# Patient Record
Sex: FEMALE | Race: WHITE | URBAN - METROPOLITAN AREA
[De-identification: names, ages, dates, MRNs, and addresses within clinical notes are randomized per-mention and may not be internally consistent; named-entity substitution may affect disease eponyms.]

---

## 2018-11-28 ENCOUNTER — APPOINTMENT (RX ONLY)
Dept: URBAN - METROPOLITAN AREA CLINIC 4 | Facility: CLINIC | Age: 68
Setting detail: DERMATOLOGY
End: 2018-11-28

## 2018-11-28 DIAGNOSIS — Z71.89 OTHER SPECIFIED COUNSELING: ICD-10-CM

## 2018-11-28 DIAGNOSIS — L71.8 OTHER ROSACEA: ICD-10-CM

## 2018-11-28 PROCEDURE — ? ADDITIONAL NOTES

## 2018-11-28 PROCEDURE — 99202 OFFICE O/P NEW SF 15 MIN: CPT

## 2018-11-28 PROCEDURE — ? COUNSELING

## 2018-11-28 PROCEDURE — ? PRESCRIPTION

## 2018-11-28 RX ORDER — METRONIDAZOLE 7.5 MG/G
CREAM TOPICAL
Qty: 1 | Refills: 6 | Status: ERX | COMMUNITY
Start: 2018-11-28

## 2018-11-28 RX ADMIN — METRONIDAZOLE 1: 7.5 CREAM TOPICAL at 00:00

## 2018-11-28 ASSESSMENT — LOCATION SIMPLE DESCRIPTION DERM
LOCATION SIMPLE: CHEST
LOCATION SIMPLE: LEFT CHEEK
LOCATION SIMPLE: RIGHT CHEEK

## 2018-11-28 ASSESSMENT — LOCATION ZONE DERM
LOCATION ZONE: TRUNK
LOCATION ZONE: FACE

## 2018-11-28 ASSESSMENT — LOCATION DETAILED DESCRIPTION DERM
LOCATION DETAILED: RIGHT INFERIOR MEDIAL MALAR CHEEK
LOCATION DETAILED: LEFT INFERIOR CENTRAL MALAR CHEEK
LOCATION DETAILED: STERNAL NOTCH

## 2018-11-28 NOTE — HPI: RASH
What Type Of Note Output Would You Prefer (Optional)?: Standard Output
How Severe Is Your Rash?: mild
Is This A New Presentation, Or A Follow-Up?: Rash
Additional History: Patient states she always uses Oil of Olay and then changed to a citrus cleanser 2 years ago. She’s always used a moisturizer from [employer].

## 2018-11-28 NOTE — PROCEDURE: ADDITIONAL NOTES
Detail Level: Detailed
Additional Notes: Patient states she is unwilling to change/discontinue her facial regimen at this time, however is open to adding a topical Rx. Will attempt Metrocream. Explained other potential treatment options if this fails.

## 2019-05-06 ENCOUNTER — APPOINTMENT (RX ONLY)
Dept: URBAN - METROPOLITAN AREA CLINIC 4 | Facility: CLINIC | Age: 69
Setting detail: DERMATOLOGY
End: 2019-05-06

## 2019-05-06 DIAGNOSIS — D18.0 HEMANGIOMA: ICD-10-CM

## 2019-05-06 DIAGNOSIS — L259 CONTACT DERMATITIS AND OTHER ECZEMA, UNSPECIFIED CAUSE: ICD-10-CM

## 2019-05-06 DIAGNOSIS — L82.1 OTHER SEBORRHEIC KERATOSIS: ICD-10-CM

## 2019-05-06 DIAGNOSIS — D22 MELANOCYTIC NEVI: ICD-10-CM

## 2019-05-06 DIAGNOSIS — L81.4 OTHER MELANIN HYPERPIGMENTATION: ICD-10-CM

## 2019-05-06 DIAGNOSIS — Z71.89 OTHER SPECIFIED COUNSELING: ICD-10-CM

## 2019-05-06 PROBLEM — D22.71 MELANOCYTIC NEVI OF RIGHT LOWER LIMB, INCLUDING HIP: Status: ACTIVE | Noted: 2019-05-06

## 2019-05-06 PROBLEM — D22.39 MELANOCYTIC NEVI OF OTHER PARTS OF FACE: Status: ACTIVE | Noted: 2019-05-06

## 2019-05-06 PROBLEM — D22.72 MELANOCYTIC NEVI OF LEFT LOWER LIMB, INCLUDING HIP: Status: ACTIVE | Noted: 2019-05-06

## 2019-05-06 PROBLEM — D22.5 MELANOCYTIC NEVI OF TRUNK: Status: ACTIVE | Noted: 2019-05-06

## 2019-05-06 PROBLEM — D22.62 MELANOCYTIC NEVI OF LEFT UPPER LIMB, INCLUDING SHOULDER: Status: ACTIVE | Noted: 2019-05-06

## 2019-05-06 PROBLEM — L30.8 OTHER SPECIFIED DERMATITIS: Status: ACTIVE | Noted: 2019-05-06

## 2019-05-06 PROBLEM — D18.01 HEMANGIOMA OF SKIN AND SUBCUTANEOUS TISSUE: Status: ACTIVE | Noted: 2019-05-06

## 2019-05-06 PROBLEM — D22.61 MELANOCYTIC NEVI OF RIGHT UPPER LIMB, INCLUDING SHOULDER: Status: ACTIVE | Noted: 2019-05-06

## 2019-05-06 PROCEDURE — ? COUNSELING

## 2019-05-06 PROCEDURE — ? PRESCRIPTION

## 2019-05-06 PROCEDURE — 99214 OFFICE O/P EST MOD 30 MIN: CPT

## 2019-05-06 PROCEDURE — ? ADDITIONAL NOTES

## 2019-05-06 PROCEDURE — ? MEDICATION COUNSELING

## 2019-05-06 RX ORDER — TRIAMCINOLONE ACETONIDE 1 MG/G
CREAM TOPICAL QD
Qty: 1 | Refills: 2 | Status: ERX | COMMUNITY
Start: 2019-05-06

## 2019-05-06 RX ADMIN — TRIAMCINOLONE ACETONIDE 1: 1 CREAM TOPICAL at 00:00

## 2019-05-06 ASSESSMENT — LOCATION ZONE DERM
LOCATION ZONE: TRUNK
LOCATION ZONE: FACE
LOCATION ZONE: ARM
LOCATION ZONE: LEG

## 2019-05-06 ASSESSMENT — LOCATION DETAILED DESCRIPTION DERM
LOCATION DETAILED: LEFT MEDIAL UPPER BACK
LOCATION DETAILED: LEFT MEDIAL MALAR CHEEK
LOCATION DETAILED: LEFT PROXIMAL POSTERIOR UPPER ARM
LOCATION DETAILED: LEFT DISTAL POSTERIOR UPPER ARM
LOCATION DETAILED: LEFT ANTERIOR DISTAL UPPER ARM
LOCATION DETAILED: LOWER STERNUM
LOCATION DETAILED: RIGHT DISTAL POSTERIOR UPPER ARM
LOCATION DETAILED: SUPERIOR THORACIC SPINE
LOCATION DETAILED: LEFT SUPERIOR MEDIAL UPPER BACK
LOCATION DETAILED: RIGHT INFERIOR MEDIAL MALAR CHEEK
LOCATION DETAILED: RIGHT DISTAL POSTERIOR THIGH
LOCATION DETAILED: RIGHT PROXIMAL POSTERIOR UPPER ARM
LOCATION DETAILED: INFERIOR THORACIC SPINE
LOCATION DETAILED: EPIGASTRIC SKIN
LOCATION DETAILED: LEFT DISTAL POSTERIOR THIGH
LOCATION DETAILED: MIDDLE STERNUM
LOCATION DETAILED: LEFT INFERIOR MEDIAL FOREHEAD
LOCATION DETAILED: RIGHT ANTERIOR DISTAL UPPER ARM
LOCATION DETAILED: LEFT ELBOW

## 2019-05-06 ASSESSMENT — LOCATION SIMPLE DESCRIPTION DERM
LOCATION SIMPLE: LEFT UPPER BACK
LOCATION SIMPLE: RIGHT POSTERIOR UPPER ARM
LOCATION SIMPLE: RIGHT POSTERIOR THIGH
LOCATION SIMPLE: RIGHT UPPER ARM
LOCATION SIMPLE: LEFT CHEEK
LOCATION SIMPLE: LEFT ELBOW
LOCATION SIMPLE: LEFT FOREHEAD
LOCATION SIMPLE: LEFT POSTERIOR THIGH
LOCATION SIMPLE: LEFT UPPER ARM
LOCATION SIMPLE: UPPER BACK
LOCATION SIMPLE: RIGHT CHEEK
LOCATION SIMPLE: LEFT POSTERIOR UPPER ARM
LOCATION SIMPLE: CHEST
LOCATION SIMPLE: ABDOMEN

## 2019-05-06 NOTE — PROCEDURE: MEDICATION COUNSELING
Griseofulvin Pregnancy And Lactation Text: This medication is Pregnancy Category X and is known to cause serious birth defects. It is unknown if this medication is excreted in breast milk but breast feeding should be avoided.
Clindamycin Pregnancy And Lactation Text: This medication can be used in pregnancy if certain situations. Clindamycin is also present in breast milk.
Tazorac Counseling:  Patient advised that medication is irritating and drying.  Patient may need to apply sparingly and wash off after an hour before eventually leaving it on overnight.  The patient verbalized understanding of the proper use and possible adverse effects of tazorac.  All of the patient's questions and concerns were addressed.
Oxybutynin Counseling:  I discussed with the patient the risks of oxybutynin including but not limited to skin rash, drowsiness, dry mouth, difficulty urinating, and blurred vision.
Colchicine Pregnancy And Lactation Text: This medication is Pregnancy Category C and isn't considered safe during pregnancy. It is excreted in breast milk.
Cyclophosphamide Counseling:  I discussed with the patient the risks of cyclophosphamide including but not limited to hair loss, hormonal abnormalities, decreased fertility, abdominal pain, diarrhea, nausea and vomiting, bone marrow suppression and infection. The patient understands that monitoring is required while taking this medication.
Siliq Counseling:  I discussed with the patient the risks of Siliq including but not limited to new or worsening depression, suicidal thoughts and behavior, immunosuppression, malignancy, posterior leukoencephalopathy syndrome, and serious infections.  The patient understands that monitoring is required including a PPD at baseline and must alert us or the primary physician if symptoms of infection or other concerning signs are noted. There is also a special program designed to monitor depression which is required with Siliq.
Imiquimod Counseling:  I discussed with the patient the risks of imiquimod including but not limited to erythema, scaling, itching, weeping, crusting, and pain.  Patient understands that the inflammatory response to imiquimod is variable from person to person and was educated regarded proper titration schedule.  If flu-like symptoms develop, patient knows to discontinue the medication and contact us.
Isotretinoin Pregnancy And Lactation Text: This medication is Pregnancy Category X and is considered extremely dangerous during pregnancy. It is unknown if it is excreted in breast milk.
Imiquimod Pregnancy And Lactation Text: This medication is Pregnancy Category C. It is unknown if this medication is excreted in breast milk.
Itraconazole Counseling:  I discussed with the patient the risks of itraconazole including but not limited to liver damage, nausea/vomiting, neuropathy, and severe allergy.  The patient understands that this medication is best absorbed when taken with acidic beverages such as non-diet cola or ginger ale.  The patient understands that monitoring is required including baseline LFTs and repeat LFTs at intervals.  The patient understands that they are to contact us or the primary physician if concerning signs are noted.
High Dose Vitamin A Counseling: Side effects reviewed, pt to contact office should one occur.
Oxybutynin Pregnancy And Lactation Text: This medication is Pregnancy Category B and is considered safe during pregnancy. It is unknown if it is excreted in breast milk.
Doxycycline Counseling:  Patient counseled regarding possible photosensitivity and increased risk for sunburn.  Patient instructed to avoid sunlight, if possible.  When exposed to sunlight, patients should wear protective clothing, sunglasses, and sunscreen.  The patient was instructed to call the office immediately if the following severe adverse effects occur:  hearing changes, easy bruising/bleeding, severe headache, or vision changes.  The patient verbalized understanding of the proper use and possible adverse effects of doxycycline.  All of the patient's questions and concerns were addressed.
Tazorac Pregnancy And Lactation Text: This medication is not safe during pregnancy. It is unknown if this medication is excreted in breast milk.
Doxycycline Pregnancy And Lactation Text: This medication is Pregnancy Category D and not consider safe during pregnancy. It is also excreted in breast milk but is considered safe for shorter treatment courses.
Cyclophosphamide Pregnancy And Lactation Text: This medication is Pregnancy Category D and it isn't considered safe during pregnancy. This medication is excreted in breast milk.
Dapsone Counseling: I discussed with the patient the risks of dapsone including but not limited to hemolytic anemia, agranulocytosis, rashes, methemoglobinemia, kidney failure, peripheral neuropathy, headaches, GI upset, and liver toxicity.  Patients who start dapsone require monitoring including baseline LFTs and weekly CBCs for the first month, then every month thereafter.  The patient verbalized understanding of the proper use and possible adverse effects of dapsone.  All of the patient's questions and concerns were addressed.
Siliq Pregnancy And Lactation Text: The risk during pregnancy and breastfeeding is uncertain with this medication.
Cyclosporine Counseling:  I discussed with the patient the risks of cyclosporine including but not limited to hypertension, gingival hyperplasia,myelosuppression, immunosuppression, liver damage, kidney damage, neurotoxicity, lymphoma, and serious infections. The patient understands that monitoring is required including baseline blood pressure, CBC, CMP, lipid panel and uric acid, and then 1-2 times monthly CMP and blood pressure.
Birth Control Pills Counseling: Birth Control Pill Counseling: I discussed with the patient the potential side effects of OCPs including but not limited to increased risk of stroke, heart attack, thrombophlebitis, deep venous thrombosis, hepatic adenomas, breast changes, GI upset, headaches, and depression.  The patient verbalized understanding of the proper use and possible adverse effects of OCPs. All of the patient's questions and concerns were addressed.
Dapsone Pregnancy And Lactation Text: This medication is Pregnancy Category C and is not considered safe during pregnancy or breast feeding.
Minoxidil Counseling: Minoxidil is a topical medication which can increase blood flow where it is applied. It is uncertain how this medication increases hair growth. Side effects are uncommon and include stinging and allergic reactions.
Topical Clindamycin Counseling: Patient counseled that this medication may cause skin irritation or allergic reactions.  In the event of skin irritation, the patient was advised to reduce the amount of the drug applied or use it less frequently.   The patient verbalized understanding of the proper use and possible adverse effects of clindamycin.  All of the patient's questions and concerns were addressed.
Simponi Counseling:  I discussed with the patient the risks of golimumab including but not limited to myelosuppression, immunosuppression, autoimmune hepatitis, demyelinating diseases, lymphoma, and serious infections.  The patient understands that monitoring is required including a PPD at baseline and must alert us or the primary physician if symptoms of infection or other concerning signs are noted.
High Dose Vitamin A Pregnancy And Lactation Text: High dose vitamin A therapy is contraindicated during pregnancy and breast feeding.
Itraconazole Pregnancy And Lactation Text: This medication is Pregnancy Category C and it isn't know if it is safe during pregnancy. It is also excreted in breast milk.
Ketoconazole Counseling:   Patient counseled regarding improving absorption with orange juice.  Adverse effects include but are not limited to breast enlargement, headache, diarrhea, nausea, upset stomach, liver function test abnormalities, taste disturbance, and stomach pain.  There is a rare possibility of liver failure that can occur when taking ketoconazole. The patient understands that monitoring of LFTs may be required, especially at baseline. The patient verbalized understanding of the proper use and possible adverse effects of ketoconazole.  All of the patient's questions and concerns were addressed.
Erythromycin Counseling:  I discussed with the patient the risks of erythromycin including but not limited to GI upset, allergic reaction, drug rash, diarrhea, increase in liver enzymes, and yeast infections.
Erivedge Counseling- I discussed with the patient the risks of Erivedge including but not limited to nausea, vomiting, diarrhea, constipation, weight loss, changes in the sense of taste, decreased appetite, muscle spasms, and hair loss.  The patient verbalized understanding of the proper use and possible adverse effects of Erivedge.  All of the patient's questions and concerns were addressed.
Minoxidil Pregnancy And Lactation Text: This medication has not been assigned a Pregnancy Risk Category but animal studies failed to show danger with the topical medication. It is unknown if the medication is excreted in breast milk.
Cimzia Counseling:  I discussed with the patient the risks of Cimzia including but not limited to immunosuppression, allergic reactions and infections.  The patient understands that monitoring is required including a PPD at baseline and must alert us or the primary physician if symptoms of infection or other concerning signs are noted.
Topical Sulfur Applications Counseling: Topical Sulfur Counseling: Patient counseled that this medication may cause skin irritation or allergic reactions.  In the event of skin irritation, the patient was advised to reduce the amount of the drug applied or use it less frequently.   The patient verbalized understanding of the proper use and possible adverse effects of topical sulfur application.  All of the patient's questions and concerns were addressed.
Stelara Counseling:  I discussed with the patient the risks of ustekinumab including but not limited to immunosuppression, malignancy, posterior leukoencephalopathy syndrome, and serious infections.  The patient understands that monitoring is required including a PPD at baseline and must alert us or the primary physician if symptoms of infection or other concerning signs are noted.
Cimzia Pregnancy And Lactation Text: This medication crosses the placenta but can be considered safe in certain situations. Cimzia may be excreted in breast milk.
Ketoconazole Pregnancy And Lactation Text: This medication is Pregnancy Category C and it isn't know if it is safe during pregnancy. It is also excreted in breast milk and breast feeding isn't recommended.
Birth Control Pills Pregnancy And Lactation Text: This medication should be avoided if pregnant and for the first 30 days post-partum.
Erythromycin Pregnancy And Lactation Text: This medication is Pregnancy Category B and is considered safe during pregnancy. It is also excreted in breast milk.
Metronidazole Counseling:  I discussed with the patient the risks of metronidazole including but not limited to seizures, nausea/vomiting, a metallic taste in the mouth, nausea/vomiting and severe allergy.
Erivedge Pregnancy And Lactation Text: This medication is Pregnancy Category X and is absolutely contraindicated during pregnancy. It is unknown if it is excreted in breast milk.
Gabapentin Counseling: I discussed with the patient the risks of gabapentin including but not limited to dizziness, somnolence, fatigue and ataxia.
Topical Sulfur Applications Pregnancy And Lactation Text: This medication is Pregnancy Category C and has an unknown safety profile during pregnancy. It is unknown if this topical medication is excreted in breast milk.
Spironolactone Counseling: Patient advised regarding risks of diarrhea, abdominal pain, hyperkalemia, birth defects (for female patients), liver toxicity and renal toxicity. The patient may need blood work to monitor liver and kidney function and potassium levels while on therapy. The patient verbalized understanding of the proper use and possible adverse effects of spironolactone.  All of the patient's questions and concerns were addressed.
Stelara Pregnancy And Lactation Text: This medication is Pregnancy Category B and is considered safe during pregnancy. It is unknown if this medication is excreted in breast milk.
Cosentyx Counseling:  I discussed with the patient the risks of Cosentyx including but not limited to worsening of Crohn's disease, immunosuppression, allergic reactions and infections.  The patient understands that monitoring is required including a PPD at baseline and must alert us or the primary physician if symptoms of infection or other concerning signs are noted.
Terbinafine Counseling: Patient counseling regarding adverse effects of terbinafine including but not limited to headache, diarrhea, rash, upset stomach, liver function test abnormalities, itching, taste/smell disturbance, nausea, abdominal pain, and flatulence.  There is a rare possibility of liver failure that can occur when taking terbinafine.  The patient understands that a baseline LFT and kidney function test may be required. The patient verbalized understanding of the proper use and possible adverse effects of terbinafine.  All of the patient's questions and concerns were addressed.
Terbinafine Pregnancy And Lactation Text: This medication is Pregnancy Category B and is considered safe during pregnancy. It is also excreted in breast milk and breast feeding isn't recommended.
Metronidazole Pregnancy And Lactation Text: This medication is Pregnancy Category B and considered safe during pregnancy.  It is also excreted in breast milk.
SSKI Counseling:  I discussed with the patient the risks of SSKI including but not limited to thyroid abnormalities, metallic taste, GI upset, fever, headache, acne, arthralgias, paraesthesias, lymphadenopathy, easy bleeding, arrhythmias, and allergic reaction.
Benzoyl Peroxide Counseling: Patient counseled that medicine may cause skin irritation and bleach clothing.  In the event of skin irritation, the patient was advised to reduce the amount of the drug applied or use it less frequently.   The patient verbalized understanding of the proper use and possible adverse effects of benzoyl peroxide.  All of the patient's questions and concerns were addressed.
Detail Level: Simple
Spironolactone Pregnancy And Lactation Text: This medication can cause feminization of the male fetus and should be avoided during pregnancy. The active metabolite is also found in breast milk.
Benzoyl Peroxide Pregnancy And Lactation Text: This medication is Pregnancy Category C. It is unknown if benzoyl peroxide is excreted in breast milk.
Taltz Counseling: I discussed with the patient the risks of ixekizumab including but not limited to immunosuppression, serious infections, worsening of inflammatory bowel disease and drug reactions.  The patient understands that monitoring is required including a PPD at baseline and must alert us or the primary physician if symptoms of infection or other concerning signs are noted.
Wartpeel Counseling:  I discussed with the patient the risks of Wartpeel including but not limited to erythema, scaling, itching, weeping, crusting, and pain.
Wartpeel Pregnancy And Lactation Text: This medication is Pregnancy Category X and contraindicated in pregnancy and in women who may become pregnant. It is unknown if this medication is excreted in breast milk.
Glycopyrrolate Counseling:  I discussed with the patient the risks of glycopyrrolate including but not limited to skin rash, drowsiness, dry mouth, difficulty urinating, and blurred vision.
Mirvaso Counseling: Mirvaso is a topical medication which can decrease superficial blood flow where applied. Side effects are uncommon and include stinging, redness and allergic reactions.
Dupixent Counseling: I discussed with the patient the risks of dupilumab including but not limited to eye infection and irritation, cold sores, injection site reactions, worsening of asthma, allergic reactions and increased risk of parasitic infection.  Live vaccines should be avoided while taking dupilumab. Dupilumab will also interact with certain medications such as warfarin and cyclosporine. The patient understands that monitoring is required and they must alert us or the primary physician if symptoms of infection or other concerning signs are noted.
Sski Pregnancy And Lactation Text: This medication is Pregnancy Category D and isn't considered safe during pregnancy. It is excreted in breast milk.
Minocycline Counseling: Patient advised regarding possible photosensitivity and discoloration of the teeth, skin, lips, tongue and gums.  Patient instructed to avoid sunlight, if possible.  When exposed to sunlight, patients should wear protective clothing, sunglasses, and sunscreen.  The patient was instructed to call the office immediately if the following severe adverse effects occur:  hearing changes, easy bruising/bleeding, severe headache, or vision changes.  The patient verbalized understanding of the proper use and possible adverse effects of minocycline.  All of the patient's questions and concerns were addressed.
Minocycline Pregnancy And Lactation Text: This medication is Pregnancy Category D and not consider safe during pregnancy. It is also excreted in breast milk.
Include Pregnancy/Lactation Warning?: No
Carac Counseling:  I discussed with the patient the risks of Carac including but not limited to erythema, scaling, itching, weeping, crusting, and pain.
Thalidomide Counseling: I discussed with the patient the risks of thalidomide including but not limited to birth defects, anxiety, weakness, chest pain, dizziness, cough and severe allergy.
Cyclosporine Pregnancy And Lactation Text: This medication is Pregnancy Category C and it isn't know if it is safe during pregnancy. This medication is excreted in breast milk.
Tremfya Counseling: I discussed with the patient the risks of guselkumab including but not limited to immunosuppression, serious infections, worsening of inflammatory bowel disease and drug reactions.  The patient understands that monitoring is required including a PPD at baseline and must alert us or the primary physician if symptoms of infection or other concerning signs are noted.
Zyclara Counseling:  I discussed with the patient the risks of imiquimod including but not limited to erythema, scaling, itching, weeping, crusting, and pain.  Patient understands that the inflammatory response to imiquimod is variable from person to person and was educated regarded proper titration schedule.  If flu-like symptoms develop, patient knows to discontinue the medication and contact us.
Dupixent Pregnancy And Lactation Text: This medication likely crosses the placenta but the risk for the fetus is uncertain. This medication is excreted in breast milk.
Mirvaso Pregnancy And Lactation Text: This medication has not been assigned a Pregnancy Risk Category. It is unknown if the medication is excreted in breast milk.
Cimetidine Counseling:  I discussed with the patient the risks of Cimetidine including but not limited to gynecomastia, headache, diarrhea, nausea, drowsiness, arrhythmias, pancreatitis, skin rashes, psychosis, bone marrow suppression and kidney toxicity.
Picato Counseling:  I discussed with the patient the risks of Picato including but not limited to erythema, scaling, itching, weeping, crusting, and pain.
Quinolones Counseling:  I discussed with the patient the risks of fluoroquinolones including but not limited to GI upset, allergic reaction, drug rash, diarrhea, dizziness, photosensitivity, yeast infections, liver function test abnormalities, tendonitis/tendon rupture.
Glycopyrrolate Pregnancy And Lactation Text: This medication is Pregnancy Category B and is considered safe during pregnancy. It is unknown if it is excreted breast milk.
5-Fu Counseling: 5-Fluorouracil Counseling:  I discussed with the patient the risks of 5-fluorouracil including but not limited to erythema, scaling, itching, weeping, crusting, and pain.
Methotrexate Counseling:  Patient counseled regarding adverse effects of methotrexate including but not limited to nausea, vomiting, abnormalities in liver function tests. Patients may develop mouth sores, rash, diarrhea, and abnormalities in blood counts. The patient understands that monitoring is required including LFT's and blood counts.  There is a rare possibility of scarring of the liver and lung problems that can occur when taking methotrexate. Persistent nausea, loss of appetite, pale stools, dark urine, cough, and shortness of breath should be reported immediately. Patient advised to discontinue methotrexate treatment at least three months before attempting to become pregnant.  I discussed the need for folate supplements while taking methotrexate.  These supplements can decrease side effects during methotrexate treatment. The patient verbalized understanding of the proper use and possible adverse effects of methotrexate.  All of the patient's questions and concerns were addressed.
Enbrel Counseling:  I discussed with the patient the risks of etanercept including but not limited to myelosuppression, immunosuppression, autoimmune hepatitis, demyelinating diseases, lymphoma, and infections.  The patient understands that monitoring is required including a PPD at baseline and must alert us or the primary physician if symptoms of infection or other concerning signs are noted.
Xeljanz Counseling: I discussed with the patient the risks of Xeljanz therapy including increased risk of infection, liver issues, headache, diarrhea, or cold symptoms. Live vaccines should be avoided. They were instructed to call if they have any problems.
Hydroxychloroquine Counseling:  I discussed with the patient that a baseline ophthalmologic exam is needed at the start of therapy and every year thereafter while on therapy. A CBC may also be warranted for monitoring.  The side effects of this medication were discussed with the patient, including but not limited to agranulocytosis, aplastic anemia, seizures, rashes, retinopathy, and liver toxicity. Patient instructed to call the office should any adverse effect occur.  The patient verbalized understanding of the proper use and possible adverse effects of Plaquenil.  All the patient's questions and concerns were addressed.
Rifampin Counseling: I discussed with the patient the risks of rifampin including but not limited to liver damage, kidney damage, red-orange body fluids, nausea/vomiting and severe allergy.
Niacinamide Counseling: I recommended taking niacin or niacinamide, also know as vitamin B3, twice daily. Recent evidence suggests that taking vitamin B3 (500 mg twice daily) can reduce the risk of actinic keratoses and non-melanoma skin cancers. Side effects of vitamin B3 include flushing and headache.
Methotrexate Pregnancy And Lactation Text: This medication is Pregnancy Category X and is known to cause fetal harm. This medication is excreted in breast milk.
Doxepin Counseling:  Patient advised that the medication is sedating and not to drive a car after taking this medication. Patient informed of potential adverse effects including but not limited to dry mouth, urinary retention, and blurry vision.  The patient verbalized understanding of the proper use and possible adverse effects of doxepin.  All of the patient's questions and concerns were addressed.
Doxepin Pregnancy And Lactation Text: This medication is Pregnancy Category C and it isn't known if it is safe during pregnancy. It is also excreted in breast milk and breast feeding isn't recommended.
Hydroxychloroquine Pregnancy And Lactation Text: This medication has been shown to cause fetal harm but it isn't assigned a Pregnancy Risk Category. There are small amounts excreted in breast milk.
Prednisone Counseling:  I discussed with the patient the risks of prolonged use of prednisone including but not limited to weight gain, insomnia, osteoporosis, mood changes, diabetes, susceptibility to infection, glaucoma and high blood pressure.  In cases where prednisone use is prolonged, patients should be monitored with blood pressure checks, serum glucose levels and an eye exam.  Additionally, the patient may need to be placed on GI prophylaxis, PCP prophylaxis, and calcium and vitamin D supplementation and/or a bisphosphonate.  The patient verbalized understanding of the proper use and the possible adverse effects of prednisone.  All of the patient's questions and concerns were addressed.
Xelcarlz Pregnancy And Lactation Text: This medication is Pregnancy Category D and is not considered safe during pregnancy.  The risk during breast feeding is also uncertain.
Valtrex Counseling: I discussed with the patient the risks of valacyclovir including but not limited to kidney damage, nausea, vomiting and severe allergy.  The patient understands that if the infection seems to be worsening or is not improving, they are to call.
Opioid Counseling: I discussed with the patient the potential side effects of opioids including but not limited to addiction, altered mental status, and depression. I stressed avoiding alcohol, benzodiazepines, muscle relaxants and sleep aids unless specifically okayed by a physician. The patient verbalized understanding of the proper use and possible adverse effects of opioids. All of the patient's questions and concerns were addressed. They were instructed to flush the remaining pills down the toilet if they did not need them for pain.
Humira Counseling:  I discussed with the patient the risks of adalimumab including but not limited to myelosuppression, immunosuppression, autoimmune hepatitis, demyelinating diseases, lymphoma, and serious infections.  The patient understands that monitoring is required including a PPD at baseline and must alert us or the primary physician if symptoms of infection or other concerning signs are noted.
Protopic Counseling: Patient may experience a mild burning sensation during topical application. Protopic is not approved in children less than 2 years of age. There have been case reports of hematologic and skin malignancies in patients using topical calcineurin inhibitors although causality is questionable.
Protopic Pregnancy And Lactation Text: This medication is Pregnancy Category C. It is unknown if this medication is excreted in breast milk when applied topically.
Niacinamide Pregnancy And Lactation Text: These medications are considered safe during pregnancy.
Rifampin Pregnancy And Lactation Text: This medication is Pregnancy Category C and it isn't know if it is safe during pregnancy. It is also excreted in breast milk and should not be used if you are breast feeding.
Azithromycin Counseling:  I discussed with the patient the risks of azithromycin including but not limited to GI upset, allergic reaction, drug rash, diarrhea, and yeast infections.
Drysol Counseling:  I discussed with the patient the risks of drysol/aluminum chloride including but not limited to skin rash, itching, irritation, burning.
Valtrex Pregnancy And Lactation Text: this medication is Pregnancy Category B and is considered safe during pregnancy. This medication is not directly found in breast milk but it's metabolite acyclovir is present.
Azithromycin Pregnancy And Lactation Text: This medication is considered safe during pregnancy and is also secreted in breast milk.
Hydroxyzine Counseling: Patient advised that the medication is sedating and not to drive a car after taking this medication.  Patient informed of potential adverse effects including but not limited to dry mouth, urinary retention, and blurry vision.  The patient verbalized understanding of the proper use and possible adverse effects of hydroxyzine.  All of the patient's questions and concerns were addressed.
Drysol Pregnancy And Lactation Text: This medication is considered safe during pregnancy and breast feeding.
Opioid Pregnancy And Lactation Text: These medications can lead to premature delivery and should be avoided during pregnancy. These medications are also present in breast milk in small amounts.
Xolair Counseling:  Patient informed of potential adverse effects including but not limited to fever, muscle aches, rash and allergic reactions.  The patient verbalized understanding of the proper use and possible adverse effects of Xolair.  All of the patient's questions and concerns were addressed.
Nsaids Counseling: NSAID Counseling: I discussed with the patient that NSAIDs should be taken with food. Prolonged use of NSAIDs can result in the development of stomach ulcers.  Patient advised to stop taking NSAIDs if abdominal pain occurs.  The patient verbalized understanding of the proper use and possible adverse effects of NSAIDs.  All of the patient's questions and concerns were addressed.
Tetracycline Counseling: Patient counseled regarding possible photosensitivity and increased risk for sunburn.  Patient instructed to avoid sunlight, if possible.  When exposed to sunlight, patients should wear protective clothing, sunglasses, and sunscreen.  The patient was instructed to call the office immediately if the following severe adverse effects occur:  hearing changes, easy bruising/bleeding, severe headache, or vision changes.  The patient verbalized understanding of the proper use and possible adverse effects of tetracycline.  All of the patient's questions and concerns were addressed. Patient understands to avoid pregnancy while on therapy due to potential birth defects.
Xolair Pregnancy And Lactation Text: This medication is Pregnancy Category B and is considered safe during pregnancy. This medication is excreted in breast milk.
Rhofade Counseling: Rhofade is a topical medication which can decrease superficial blood flow where applied. Side effects are uncommon and include stinging, redness and allergic reactions.
Bactrim Counseling:  I discussed with the patient the risks of sulfa antibiotics including but not limited to GI upset, allergic reaction, drug rash, diarrhea, dizziness, photosensitivity, and yeast infections.  Rarely, more serious reactions can occur including but not limited to aplastic anemia, agranulocytosis, methemoglobinemia, blood dyscrasias, liver or kidney failure, lung infiltrates or desquamative/blistering drug rashes.
Elidel Counseling: Patient may experience a mild burning sensation during topical application. Elidel is not approved in children less than 2 years of age. There have been case reports of hematologic and skin malignancies in patients using topical calcineurin inhibitors although causality is questionable.
Ilumya Counseling: I discussed with the patient the risks of tildrakizumab including but not limited to immunosuppression, malignancy, posterior leukoencephalopathy syndrome, and serious infections.  The patient understands that monitoring is required including a PPD at baseline and must alert us or the primary physician if symptoms of infection or other concerning signs are noted.
Hydroxyzine Pregnancy And Lactation Text: This medication is not safe during pregnancy and should not be taken. It is also excreted in breast milk and breast feeding isn't recommended.
Acitretin Counseling:  I discussed with the patient the risks of acitretin including but not limited to hair loss, dry lips/skin/eyes, liver damage, hyperlipidemia, depression/suicidal ideation, photosensitivity.  Serious rare side effects can include but are not limited to pancreatitis, pseudotumor cerebri, bony changes, clot formation/stroke/heart attack.  Patient understands that alcohol is contraindicated since it can result in liver toxicity and significantly prolong the elimination of the drug by many years.
Arava Counseling:  Patient counseled regarding adverse effects of Arava including but not limited to nausea, vomiting, abnormalities in liver function tests. Patients may develop mouth sores, rash, diarrhea, and abnormalities in blood counts. The patient understands that monitoring is required including LFTs and blood counts.  There is a rare possibility of scarring of the liver and lung problems that can occur when taking methotrexate. Persistent nausea, loss of appetite, pale stools, dark urine, cough, and shortness of breath should be reported immediately. Patient advised to discontinue Arava treatment and consult with a physician prior to attempting conception. The patient will have to undergo a treatment to eliminate Arava from the body prior to conception.
Solaraze Counseling:  I discussed with the patient the risks of Solaraze including but not limited to erythema, scaling, itching, weeping, crusting, and pain.
Bactrim Pregnancy And Lactation Text: This medication is Pregnancy Category D and is known to cause fetal risk.  It is also excreted in breast milk.
Nsaids Pregnancy And Lactation Text: These medications are considered safe up to 30 weeks gestation. It is excreted in breast milk.
Azathioprine Counseling:  I discussed with the patient the risks of azathioprine including but not limited to myelosuppression, immunosuppression, hepatotoxicity, lymphoma, and infections.  The patient understands that monitoring is required including baseline LFTs, Creatinine, possible TPMP genotyping and weekly CBCs for the first month and then every 2 weeks thereafter.  The patient verbalized understanding of the proper use and possible adverse effects of azathioprine.  All of the patient's questions and concerns were addressed.
Eucrisa Counseling: Patient may experience a mild burning sensation during topical application. Eucrisa is not approved in children less than 2 years of age.
Infliximab Counseling:  I discussed with the patient the risks of infliximab including but not limited to myelosuppression, immunosuppression, autoimmune hepatitis, demyelinating diseases, lymphoma, and serious infections.  The patient understands that monitoring is required including a PPD at baseline and must alert us or the primary physician if symptoms of infection or other concerning signs are noted.
Acitretin Pregnancy And Lactation Text: This medication is Pregnancy Category X and should not be given to women who are pregnant or may become pregnant in the future. This medication is excreted in breast milk.
Albendazole Counseling:  I discussed with the patient the risks of albendazole including but not limited to cytopenia, kidney damage, nausea/vomiting and severe allergy.  The patient understands that this medication is being used in an off-label manner.
Fluconazole Counseling:  Patient counseled regarding adverse effects of fluconazole including but not limited to headache, diarrhea, nausea, upset stomach, liver function test abnormalities, taste disturbance, and stomach pain.  There is a rare possibility of liver failure that can occur when taking fluconazole.  The patient understands that monitoring of LFTs and kidney function test may be required, especially at baseline. The patient verbalized understanding of the proper use and possible adverse effects of fluconazole.  All of the patient's questions and concerns were addressed.
Clofazimine Counseling:  I discussed with the patient the risks of clofazimine including but not limited to skin and eye pigmentation, liver damage, nausea/vomiting, gastrointestinal bleeding and allergy.
Cephalexin Counseling: I counseled the patient regarding use of cephalexin as an antibiotic for prophylactic and/or therapeutic purposes. Cephalexin (commonly prescribed under brand name Keflex) is a cephalosporin antibiotic which is active against numerous classes of bacteria, including most skin bacteria. Side effects may include nausea, diarrhea, gastrointestinal upset, rash, hives, yeast infections, and in rare cases, hepatitis, kidney disease, seizures, fever, confusion, neurologic symptoms, and others. Patients with severe allergies to penicillin medications are cautioned that there is about a 10% incidence of cross-reactivity with cephalosporins. When possible, patients with penicillin allergies should use alternatives to cephalosporins for antibiotic therapy.
Odomzo Counseling- I discussed with the patient the risks of Odomzo including but not limited to nausea, vomiting, diarrhea, constipation, weight loss, changes in the sense of taste, decreased appetite, muscle spasms, and hair loss.  The patient verbalized understanding of the proper use and possible adverse effects of Odomzo.  All of the patient's questions and concerns were addressed.
Solaraze Pregnancy And Lactation Text: This medication is Pregnancy Category B and is considered safe. There is some data to suggest avoiding during the third trimester. It is unknown if this medication is excreted in breast milk.
Azathioprine Pregnancy And Lactation Text: This medication is Pregnancy Category D and isn't considered safe during pregnancy. It is unknown if this medication is excreted in breast milk.
Otezla Counseling: The side effects of Otezla were discussed with the patient, including but not limited to worsening or new depression, weight loss, diarrhea, nausea, upper respiratory tract infection, and headache. Patient instructed to call the office should any adverse effect occur.  The patient verbalized understanding of the proper use and possible adverse effects of Otezla.  All the patient's questions and concerns were addressed.
Albendazole Pregnancy And Lactation Text: This medication is Pregnancy Category C and it isn't known if it is safe during pregnancy. It is also excreted in breast milk.
Bexarotene Counseling:  I discussed with the patient the risks of bexarotene including but not limited to hair loss, dry lips/skin/eyes, liver abnormalities, hyperlipidemia, pancreatitis, depression/suicidal ideation, photosensitivity, drug rash/allergic reactions, hypothyroidism, anemia, leukopenia, infection, cataracts, and teratogenicity.  Patient understands that they will need regular blood tests to check lipid profile, liver function tests, white blood cell count, thyroid function tests and pregnancy test if applicable.
Ivermectin Counseling:  Patient instructed to take medication on an empty stomach with a full glass of water.  Patient informed of potential adverse effects including but not limited to nausea, diarrhea, dizziness, itching, and swelling of the extremities or lymph nodes.  The patient verbalized understanding of the proper use and possible adverse effects of ivermectin.  All of the patient's questions and concerns were addressed.
Bexarotene Pregnancy And Lactation Text: This medication is Pregnancy Category X and should not be given to women who are pregnant or may become pregnant. This medication should not be used if you are breast feeding.
Cephalexin Pregnancy And Lactation Text: This medication is Pregnancy Category B and considered safe during pregnancy.  It is also excreted in breast milk but can be used safely for shorter doses.
Topical Retinoid counseling:  Patient advised to apply a pea-sized amount only at bedtime and wait 30 minutes after washing their face before applying.  If too drying, patient may add a non-comedogenic moisturizer. The patient verbalized understanding of the proper use and possible adverse effects of retinoids.  All of the patient's questions and concerns were addressed.
Clindamycin Counseling: I counseled the patient regarding use of clindamycin as an antibiotic for prophylactic and/or therapeutic purposes. Clindamycin is active against numerous classes of bacteria, including skin bacteria. Side effects may include nausea, diarrhea, gastrointestinal upset, rash, hives, yeast infections, and in rare cases, colitis.
Otezla Pregnancy And Lactation Text: This medication is Pregnancy Category C and it isn't known if it is safe during pregnancy. It is unknown if it is excreted in breast milk.
Cellcept Counseling:  I discussed with the patient the risks of mycophenolate mofetil including but not limited to infection/immunosuppression, GI upset, hypokalemia, hypercholesterolemia, bone marrow suppression, lymphoproliferative disorders, malignancy, GI ulceration/bleed/perforation, colitis, interstitial lung disease, kidney failure, progressive multifocal leukoencephalopathy, and birth defects.  The patient understands that monitoring is required including a baseline creatinine and regular CBC testing. In addition, patient must alert us immediately if symptoms of infection or other concerning signs are noted.
Rituxan Counseling:  I discussed with the patient the risks of Rituxan infusions. Side effects can include infusion reactions, severe drug rashes including mucocutaneous reactions, reactivation of latent hepatitis and other infections and rarely progressive multifocal leukoencephalopathy.  All of the patient's questions and concerns were addressed.
Hydroquinone Counseling:  Patient advised that medication may result in skin irritation, lightening (hypopigmentation), dryness, and burning.  In the event of skin irritation, the patient was advised to reduce the amount of the drug applied or use it less frequently.  Rarely, spots that are treated with hydroquinone can become darker (pseudoochronosis).  Should this occur, patient instructed to stop medication and call the office. The patient verbalized understanding of the proper use and possible adverse effects of hydroquinone.  All of the patient's questions and concerns were addressed.
Rituxan Pregnancy And Lactation Text: This medication is Pregnancy Category C and it isn't know if it is safe during pregnancy. It is unknown if this medication is excreted in breast milk but similar antibodies are known to be excreted.
Isotretinoin Counseling: Patient should get monthly blood tests, not donate blood, not drive at night if vision affected, not share medication, and not undergo elective surgery for 6 months after tx completed. Side effects reviewed, pt to contact office should one occur.
Colchicine Counseling:  Patient counseled regarding adverse effects including but not limited to stomach upset (nausea, vomiting, stomach pain, or diarrhea).  Patient instructed to limit alcohol consumption while taking this medication.  Colchicine may reduce blood counts especially with prolonged use.  The patient understands that monitoring of kidney function and blood counts may be required, especially at baseline. The patient verbalized understanding of the proper use and possible adverse effects of colchicine.  All of the patient's questions and concerns were addressed.
Griseofulvin Counseling:  I discussed with the patient the risks of griseofulvin including but not limited to photosensitivity, cytopenia, liver damage, nausea/vomiting and severe allergy.  The patient understands that this medication is best absorbed when taken with a fatty meal (e.g., ice cream or french fries).

## 2019-05-06 NOTE — HPI: EVALUATION OF SKIN LESION(S)
How Severe Are Your Spot(S)?: mild
Have Your Spot(S) Been Treated In The Past?: has not been treated
Hpi Title: Evaluation of a Skin Lesion
Additional History: Patient would like FBE today.

## 2019-05-06 NOTE — PROCEDURE: ADDITIONAL NOTES
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 \\nPatient would like to try OTC Hydrocortisone first, if no improvement she will  prescription for TAC.\\nRecommend to keep nails trimmed short.
Detail Level: Detailed

## 2020-06-04 RX ORDER — METRONIDAZOLE 7.5 MG/G
CREAM TOPICAL
Qty: 1 | Refills: 6 | Status: ERX

## 2020-08-05 ENCOUNTER — APPOINTMENT (RX ONLY)
Dept: URBAN - METROPOLITAN AREA CLINIC 4 | Facility: CLINIC | Age: 70
Setting detail: DERMATOLOGY
End: 2020-08-05

## 2020-08-05 DIAGNOSIS — D18.0 HEMANGIOMA: ICD-10-CM

## 2020-08-05 DIAGNOSIS — L57.0 ACTINIC KERATOSIS: ICD-10-CM

## 2020-08-05 DIAGNOSIS — D22 MELANOCYTIC NEVI: ICD-10-CM

## 2020-08-05 DIAGNOSIS — L82.1 OTHER SEBORRHEIC KERATOSIS: ICD-10-CM

## 2020-08-05 DIAGNOSIS — L71.8 OTHER ROSACEA: ICD-10-CM

## 2020-08-05 DIAGNOSIS — Z71.89 OTHER SPECIFIED COUNSELING: ICD-10-CM

## 2020-08-05 DIAGNOSIS — L72.0 EPIDERMAL CYST: ICD-10-CM

## 2020-08-05 DIAGNOSIS — L81.4 OTHER MELANIN HYPERPIGMENTATION: ICD-10-CM

## 2020-08-05 PROBLEM — D18.01 HEMANGIOMA OF SKIN AND SUBCUTANEOUS TISSUE: Status: ACTIVE | Noted: 2020-08-05

## 2020-08-05 PROBLEM — D22.62 MELANOCYTIC NEVI OF LEFT UPPER LIMB, INCLUDING SHOULDER: Status: ACTIVE | Noted: 2020-08-05

## 2020-08-05 PROBLEM — D22.71 MELANOCYTIC NEVI OF RIGHT LOWER LIMB, INCLUDING HIP: Status: ACTIVE | Noted: 2020-08-05

## 2020-08-05 PROBLEM — D22.61 MELANOCYTIC NEVI OF RIGHT UPPER LIMB, INCLUDING SHOULDER: Status: ACTIVE | Noted: 2020-08-05

## 2020-08-05 PROBLEM — D22.72 MELANOCYTIC NEVI OF LEFT LOWER LIMB, INCLUDING HIP: Status: ACTIVE | Noted: 2020-08-05

## 2020-08-05 PROBLEM — D22.5 MELANOCYTIC NEVI OF TRUNK: Status: ACTIVE | Noted: 2020-08-05

## 2020-08-05 PROCEDURE — 17000 DESTRUCT PREMALG LESION: CPT

## 2020-08-05 PROCEDURE — ? PRESCRIPTION

## 2020-08-05 PROCEDURE — ? LIQUID NITROGEN

## 2020-08-05 PROCEDURE — ? ADDITIONAL NOTES

## 2020-08-05 PROCEDURE — 17003 DESTRUCT PREMALG LES 2-14: CPT

## 2020-08-05 PROCEDURE — 99214 OFFICE O/P EST MOD 30 MIN: CPT | Mod: 25

## 2020-08-05 PROCEDURE — ? COUNSELING

## 2020-08-05 RX ORDER — METRONIDAZOLE 7.5 MG/G
CREAM TOPICAL
Qty: 1 | Refills: 6 | Status: ERX

## 2020-08-05 ASSESSMENT — LOCATION DETAILED DESCRIPTION DERM
LOCATION DETAILED: RIGHT DISTAL POSTERIOR THIGH
LOCATION DETAILED: RIGHT VENTRAL LATERAL DISTAL FOREARM
LOCATION DETAILED: RIGHT MEDIAL MALAR CHEEK
LOCATION DETAILED: RIGHT PROXIMAL POSTERIOR UPPER ARM
LOCATION DETAILED: LEFT VENTRAL PROXIMAL FOREARM
LOCATION DETAILED: NASAL DORSUM
LOCATION DETAILED: RIGHT INFERIOR MEDIAL MALAR CHEEK
LOCATION DETAILED: LEFT DISTAL POSTERIOR THIGH
LOCATION DETAILED: LEFT INFERIOR CENTRAL MALAR CHEEK
LOCATION DETAILED: RIGHT ANTERIOR PROXIMAL THIGH
LOCATION DETAILED: LEFT DISTAL POSTERIOR UPPER ARM
LOCATION DETAILED: LEFT CENTRAL ZYGOMA
LOCATION DETAILED: LEFT MEDIAL ZYGOMA
LOCATION DETAILED: LEFT MEDIAL UPPER BACK
LOCATION DETAILED: EPIGASTRIC SKIN
LOCATION DETAILED: LEFT ANTERIOR PROXIMAL THIGH
LOCATION DETAILED: INFERIOR THORACIC SPINE
LOCATION DETAILED: RIGHT SUPERIOR MEDIAL MIDBACK
LOCATION DETAILED: LEFT MEDIAL FRONTAL SCALP
LOCATION DETAILED: LEFT MID PREAURICULAR CHEEK
LOCATION DETAILED: MID-FRONTAL SCALP
LOCATION DETAILED: LOWER STERNUM

## 2020-08-05 ASSESSMENT — LOCATION SIMPLE DESCRIPTION DERM
LOCATION SIMPLE: ABDOMEN
LOCATION SIMPLE: LEFT THIGH
LOCATION SIMPLE: RIGHT CHEEK
LOCATION SIMPLE: CHEST
LOCATION SIMPLE: LEFT POSTERIOR UPPER ARM
LOCATION SIMPLE: ANTERIOR SCALP
LOCATION SIMPLE: RIGHT POSTERIOR THIGH
LOCATION SIMPLE: LEFT UPPER BACK
LOCATION SIMPLE: RIGHT POSTERIOR UPPER ARM
LOCATION SIMPLE: LEFT POSTERIOR THIGH
LOCATION SIMPLE: RIGHT FOREARM
LOCATION SIMPLE: NOSE
LOCATION SIMPLE: LEFT SCALP
LOCATION SIMPLE: LEFT CHEEK
LOCATION SIMPLE: LEFT FOREARM
LOCATION SIMPLE: LEFT ZYGOMA
LOCATION SIMPLE: UPPER BACK
LOCATION SIMPLE: RIGHT LOWER BACK
LOCATION SIMPLE: RIGHT THIGH

## 2020-08-05 ASSESSMENT — LOCATION ZONE DERM
LOCATION ZONE: SCALP
LOCATION ZONE: TRUNK
LOCATION ZONE: NOSE
LOCATION ZONE: ARM
LOCATION ZONE: LEG
LOCATION ZONE: FACE
LOCATION ZONE: SCALP

## 2020-08-05 NOTE — PROCEDURE: LIQUID NITROGEN
Duration Of Freeze Thaw-Cycle (Seconds): 3
Render Note In Bullet Format When Appropriate: No
Detail Level: Detailed
Post-Care Instructions: I reviewed with the patient in detail post-care instructions. Patient is to wear sunprotection, and avoid picking at any of the treated lesions. Pt may apply Vaseline to crusted or scabbing areas.
Consent: The patient's consent was obtained including but not limited to risks of crusting, scabbing, blistering, scarring, darker or lighter pigmentary change, recurrence, incomplete removal and infection.

## 2021-09-08 ENCOUNTER — APPOINTMENT (RX ONLY)
Dept: URBAN - METROPOLITAN AREA CLINIC 4 | Facility: CLINIC | Age: 71
Setting detail: DERMATOLOGY
End: 2021-09-08

## 2021-09-08 DIAGNOSIS — B00.1 HERPESVIRAL VESICULAR DERMATITIS: ICD-10-CM

## 2021-09-08 DIAGNOSIS — H00.01 HORDEOLUM EXTERNUM: ICD-10-CM

## 2021-09-08 DIAGNOSIS — L71.8 OTHER ROSACEA: ICD-10-CM

## 2021-09-08 DIAGNOSIS — D22 MELANOCYTIC NEVI: ICD-10-CM

## 2021-09-08 DIAGNOSIS — L81.4 OTHER MELANIN HYPERPIGMENTATION: ICD-10-CM

## 2021-09-08 DIAGNOSIS — L82.1 OTHER SEBORRHEIC KERATOSIS: ICD-10-CM

## 2021-09-08 DIAGNOSIS — D18.0 HEMANGIOMA: ICD-10-CM

## 2021-09-08 DIAGNOSIS — Z71.89 OTHER SPECIFIED COUNSELING: ICD-10-CM

## 2021-09-08 PROBLEM — D22.71 MELANOCYTIC NEVI OF RIGHT LOWER LIMB, INCLUDING HIP: Status: ACTIVE | Noted: 2021-09-08

## 2021-09-08 PROBLEM — D22.62 MELANOCYTIC NEVI OF LEFT UPPER LIMB, INCLUDING SHOULDER: Status: ACTIVE | Noted: 2021-09-08

## 2021-09-08 PROBLEM — D18.01 HEMANGIOMA OF SKIN AND SUBCUTANEOUS TISSUE: Status: ACTIVE | Noted: 2021-09-08

## 2021-09-08 PROBLEM — H00.012 HORDEOLUM EXTERNUM RIGHT LOWER EYELID: Status: ACTIVE | Noted: 2021-09-08

## 2021-09-08 PROBLEM — D22.61 MELANOCYTIC NEVI OF RIGHT UPPER LIMB, INCLUDING SHOULDER: Status: ACTIVE | Noted: 2021-09-08

## 2021-09-08 PROBLEM — D22.5 MELANOCYTIC NEVI OF TRUNK: Status: ACTIVE | Noted: 2021-09-08

## 2021-09-08 PROBLEM — D22.72 MELANOCYTIC NEVI OF LEFT LOWER LIMB, INCLUDING HIP: Status: ACTIVE | Noted: 2021-09-08

## 2021-09-08 PROCEDURE — ? PRESCRIPTION

## 2021-09-08 PROCEDURE — 99214 OFFICE O/P EST MOD 30 MIN: CPT

## 2021-09-08 PROCEDURE — ? DIAGNOSIS COMMENT

## 2021-09-08 PROCEDURE — ? COUNSELING

## 2021-09-08 PROCEDURE — ? ADDITIONAL NOTES

## 2021-09-08 RX ORDER — METRONIDAZOLE 7.5 MG/G
CREAM TOPICAL BID
Qty: 45 | Refills: 6 | Status: ERX | COMMUNITY
Start: 2021-09-08

## 2021-09-08 RX ADMIN — METRONIDAZOLE: 7.5 CREAM TOPICAL at 00:00

## 2021-09-08 ASSESSMENT — LOCATION ZONE DERM
LOCATION ZONE: LEG
LOCATION ZONE: EYELID
LOCATION ZONE: ARM
LOCATION ZONE: FACE
LOCATION ZONE: TRUNK
LOCATION ZONE: LIP

## 2021-09-08 ASSESSMENT — LOCATION DETAILED DESCRIPTION DERM
LOCATION DETAILED: LEFT PROXIMAL POSTERIOR THIGH
LOCATION DETAILED: LOWER STERNUM
LOCATION DETAILED: INFERIOR THORACIC SPINE
LOCATION DETAILED: LEFT ANTERIOR PROXIMAL UPPER ARM
LOCATION DETAILED: LEFT MID PREAURICULAR CHEEK
LOCATION DETAILED: RIGHT PROXIMAL LATERAL POSTERIOR THIGH
LOCATION DETAILED: RIGHT ANTERIOR PROXIMAL THIGH
LOCATION DETAILED: LEFT ANTERIOR PROXIMAL THIGH
LOCATION DETAILED: LEFT SUPERIOR VERMILION LIP
LOCATION DETAILED: RIGHT VENTRAL LATERAL DISTAL FOREARM
LOCATION DETAILED: LEFT PROXIMAL POSTERIOR UPPER ARM
LOCATION DETAILED: LEFT INFERIOR MEDIAL FOREHEAD
LOCATION DETAILED: LEFT DISTAL POSTERIOR UPPER ARM
LOCATION DETAILED: LEFT ANTERIOR DISTAL UPPER ARM
LOCATION DETAILED: RIGHT INFERIOR MEDIAL MALAR CHEEK
LOCATION DETAILED: LEFT MEDIAL UPPER BACK
LOCATION DETAILED: RIGHT LATERAL INFERIOR EYELID
LOCATION DETAILED: RIGHT INFERIOR MEDIAL FOREHEAD
LOCATION DETAILED: RIGHT ANTERIOR PROXIMAL UPPER ARM
LOCATION DETAILED: RIGHT ANTERIOR DISTAL UPPER ARM
LOCATION DETAILED: RIGHT SUPERIOR MEDIAL MIDBACK
LOCATION DETAILED: LEFT DISTAL POSTERIOR THIGH
LOCATION DETAILED: RIGHT PROXIMAL POSTERIOR UPPER ARM
LOCATION DETAILED: EPIGASTRIC SKIN
LOCATION DETAILED: LEFT INFERIOR CENTRAL MALAR CHEEK
LOCATION DETAILED: LEFT VENTRAL PROXIMAL FOREARM
LOCATION DETAILED: RIGHT MEDIAL MALAR CHEEK
LOCATION DETAILED: RIGHT DISTAL POSTERIOR THIGH

## 2021-09-08 ASSESSMENT — LOCATION SIMPLE DESCRIPTION DERM
LOCATION SIMPLE: LEFT UPPER BACK
LOCATION SIMPLE: LEFT FOREARM
LOCATION SIMPLE: RIGHT FOREARM
LOCATION SIMPLE: RIGHT LOWER BACK
LOCATION SIMPLE: LEFT UPPER ARM
LOCATION SIMPLE: LEFT THIGH
LOCATION SIMPLE: UPPER BACK
LOCATION SIMPLE: RIGHT UPPER ARM
LOCATION SIMPLE: CHEST
LOCATION SIMPLE: RIGHT CHEEK
LOCATION SIMPLE: RIGHT POSTERIOR THIGH
LOCATION SIMPLE: ABDOMEN
LOCATION SIMPLE: RIGHT THIGH
LOCATION SIMPLE: LEFT LIP
LOCATION SIMPLE: RIGHT POSTERIOR UPPER ARM
LOCATION SIMPLE: LEFT POSTERIOR THIGH
LOCATION SIMPLE: LEFT POSTERIOR UPPER ARM
LOCATION SIMPLE: LEFT FOREHEAD
LOCATION SIMPLE: LEFT CHEEK
LOCATION SIMPLE: RIGHT INFERIOR EYELID
LOCATION SIMPLE: RIGHT FOREHEAD

## 2021-09-08 NOTE — PROCEDURE: DIAGNOSIS COMMENT
Comment: Lesion of concern is clinically consistent with seborrheic keratosis.
Detail Level: Detailed
Render Risk Assessment In Note?: no
Comment: Patient states she uses Abreva for Herpes Simplex.

## 2021-09-08 NOTE — HPI: RASH (ROSACEA)
How Severe Is Your Rosacea?: mild
Is This A New Presentation, Or A Follow-Up?: Follow Up Rosacea
Additional History: \\n\\nPatient would like more 0.075% Metrocream for Rosacea, is tolerating Rx well and would like a refill.

## 2021-09-08 NOTE — PROCEDURE: ADDITIONAL NOTES
Render Risk Assessment In Note?: no
Detail Level: Simple
Additional Notes: Discussed patient to wash gently, use warm compresses

## 2023-01-24 ENCOUNTER — APPOINTMENT (RX ONLY)
Dept: URBAN - METROPOLITAN AREA CLINIC 6 | Facility: CLINIC | Age: 73
Setting detail: DERMATOLOGY
End: 2023-01-24

## 2023-01-24 DIAGNOSIS — L82.1 OTHER SEBORRHEIC KERATOSIS: ICD-10-CM

## 2023-01-24 DIAGNOSIS — L81.4 OTHER MELANIN HYPERPIGMENTATION: ICD-10-CM

## 2023-01-24 DIAGNOSIS — Z71.89 OTHER SPECIFIED COUNSELING: ICD-10-CM

## 2023-01-24 DIAGNOSIS — L71.8 OTHER ROSACEA: ICD-10-CM | Status: WELL CONTROLLED

## 2023-01-24 DIAGNOSIS — D22 MELANOCYTIC NEVI: ICD-10-CM

## 2023-01-24 DIAGNOSIS — D18.0 HEMANGIOMA: ICD-10-CM

## 2023-01-24 DIAGNOSIS — D492 NEOPLASM OF UNSPECIFIED NATURE OF BONE, SOFT TISSUE, AND SKIN: ICD-10-CM

## 2023-01-24 PROBLEM — D22.5 MELANOCYTIC NEVI OF TRUNK: Status: ACTIVE | Noted: 2023-01-24

## 2023-01-24 PROBLEM — D18.01 HEMANGIOMA OF SKIN AND SUBCUTANEOUS TISSUE: Status: ACTIVE | Noted: 2023-01-24

## 2023-01-24 PROBLEM — D22.61 MELANOCYTIC NEVI OF RIGHT UPPER LIMB, INCLUDING SHOULDER: Status: ACTIVE | Noted: 2023-01-24

## 2023-01-24 PROBLEM — D22.62 MELANOCYTIC NEVI OF LEFT UPPER LIMB, INCLUDING SHOULDER: Status: ACTIVE | Noted: 2023-01-24

## 2023-01-24 PROBLEM — R22.41 LOCALIZED SWELLING, MASS AND LUMP, RIGHT LOWER LIMB: Status: ACTIVE | Noted: 2023-01-24

## 2023-01-24 PROCEDURE — 99213 OFFICE O/P EST LOW 20 MIN: CPT

## 2023-01-24 PROCEDURE — ? SUNSCREEN TREATMENT REGIMEN

## 2023-01-24 PROCEDURE — ? PRESCRIPTION

## 2023-01-24 PROCEDURE — ? ADDITIONAL NOTES

## 2023-01-24 PROCEDURE — ? COUNSELING

## 2023-01-24 RX ORDER — METRONIDAZOLE 7.5 MG/G
1 CREAM TOPICAL BID
Qty: 45 | Refills: 6 | Status: ERX

## 2023-01-24 ASSESSMENT — LOCATION SIMPLE DESCRIPTION DERM
LOCATION SIMPLE: RIGHT THIGH
LOCATION SIMPLE: UPPER BACK
LOCATION SIMPLE: LEFT UPPER BACK
LOCATION SIMPLE: LEFT FOREHEAD
LOCATION SIMPLE: LEFT POSTERIOR UPPER ARM
LOCATION SIMPLE: RIGHT LOWER BACK
LOCATION SIMPLE: ABDOMEN
LOCATION SIMPLE: RIGHT FOREHEAD
LOCATION SIMPLE: RIGHT UPPER ARM
LOCATION SIMPLE: LEFT FOREARM
LOCATION SIMPLE: RIGHT CHEEK
LOCATION SIMPLE: LEFT UPPER ARM
LOCATION SIMPLE: CHEST
LOCATION SIMPLE: LEFT CHEEK
LOCATION SIMPLE: RIGHT POSTERIOR UPPER ARM
LOCATION SIMPLE: RIGHT FOREARM

## 2023-01-24 ASSESSMENT — LOCATION DETAILED DESCRIPTION DERM
LOCATION DETAILED: RIGHT VENTRAL LATERAL DISTAL FOREARM
LOCATION DETAILED: LEFT INFERIOR MEDIAL FOREHEAD
LOCATION DETAILED: INFERIOR THORACIC SPINE
LOCATION DETAILED: RIGHT MEDIAL MALAR CHEEK
LOCATION DETAILED: RIGHT ANTERIOR PROXIMAL UPPER ARM
LOCATION DETAILED: RIGHT ANTERIOR PROXIMAL THIGH
LOCATION DETAILED: LEFT ANTERIOR PROXIMAL UPPER ARM
LOCATION DETAILED: LEFT DISTAL POSTERIOR UPPER ARM
LOCATION DETAILED: RIGHT INFERIOR MEDIAL MALAR CHEEK
LOCATION DETAILED: LEFT MEDIAL UPPER BACK
LOCATION DETAILED: RIGHT INFERIOR MEDIAL FOREHEAD
LOCATION DETAILED: EPIGASTRIC SKIN
LOCATION DETAILED: LEFT PROXIMAL POSTERIOR UPPER ARM
LOCATION DETAILED: LOWER STERNUM
LOCATION DETAILED: RIGHT PROXIMAL POSTERIOR UPPER ARM
LOCATION DETAILED: LEFT INFERIOR CENTRAL MALAR CHEEK
LOCATION DETAILED: LEFT MID PREAURICULAR CHEEK
LOCATION DETAILED: RIGHT SUPERIOR MEDIAL MIDBACK
LOCATION DETAILED: LEFT VENTRAL PROXIMAL FOREARM

## 2023-01-24 ASSESSMENT — LOCATION ZONE DERM
LOCATION ZONE: LEG
LOCATION ZONE: FACE
LOCATION ZONE: TRUNK
LOCATION ZONE: ARM

## 2023-01-24 NOTE — PROCEDURE: ADDITIONAL NOTES
Render Risk Assessment In Note?: no
Detail Level: Detailed
Additional Notes: 7cm firm, deep, and mobile nodule.\\nI recommend further imaging (MRI) or referral to a general surgeon for removal/biopsy.  She has an appointment with her PCP in 1 week, she wishes to address this lesion with her PCP. \\nKthea declined a lower body examination today, the lesion was palpated through her jeans, she declined an exam of the skin of the area at this time. \\nI emphasized importance of further evaluation. Explained that although lipomas may present this way,  liposarcomas or other potentially malignant processes can present this way as well. She has noticed the lesion growing over the past several months. \\n

## 2023-08-22 NOTE — H&P
Subjective:      Primary care physician: No primary care provider on file.  Referring Provider: Dr. Kingsley Kim    Chief Complaint: No chief complaint on file.    Diagnosis:   1. 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 female in good apparent health who presented to Dr. Kim. She underwent an ultrasound guided biopsy on 3/27/23 that demonstrated a spindle cell lesion, favoring leiomya. She subsequently underwent an excision of right thigh soft tissue mass  12 x 8 x 6 cm with 9 cm incision and wound closure on 8/4/23. 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     ***    No past medical history on file.  No past surgical history on file.  Not on File  No outpatient encounter medications on file as of 8/29/2023.     No facility-administered encounter medications on file as of 8/29/2023.     Social History     Socioeconomic History    Marital status: Not on file     Spouse name: Not on file    Number of children: Not on file    Years of education: Not on file    Highest education level: Not on file   Occupational History    Not on file   Tobacco Use    Smoking status: Not on file    Smokeless tobacco: Not on file   Substance and Sexual Activity    Alcohol use: Not on file    Drug use: Not on file    Sexual activity: Not on file   Other Topics Concern    Not on file   Social History Narrative    Not on file     Social Determinants of Health     Financial Resource Strain: Not on file   Food Insecurity: Not on file   Transportation Needs: Not on file   Physical Activity: Not on file   Stress: Not on file   Social Connections: Not on file   Intimate Partner Violence: Not on file   Housing Stability: Not on file      Social History     Tobacco Use   Smoking Status Not on file   Smokeless Tobacco Not on file     Social History     Substance and Sexual Activity   Alcohol Use Not on file     Social History      Substance and Sexual Activity   Drug Use Not on file      No family history on file.      ROS     Objective:   There were no vitals taken for this visit.    Physical Exam    Labs  None     Imaging  None    Pathology  Excision 8/4/23    Ultrasound guided biopsy 3/27/23      Procedures  Excision of right thigh soft tissue mass  12 x 8 x 6 cm with 9 cm incision and wound closure 8/4/23    Ultrasound Biopsy 3/27/23        Diagnosis:     1. 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 Ferrer, have entered, reviewed and confirmed the above diagnoses related to this patient on this date of service, as per the time and date noted at top of this note.

## 2023-08-23 ENCOUNTER — HOSPITAL ENCOUNTER (OUTPATIENT)
Dept: RADIOLOGY | Facility: MEDICAL CENTER | Age: 73
End: 2023-08-23
Payer: MEDICARE

## 2023-08-25 DIAGNOSIS — C49.21: Primary | ICD-10-CM

## 2023-08-28 ENCOUNTER — OFFICE VISIT (OUTPATIENT)
Dept: SURGICAL ONCOLOGY | Facility: MEDICAL CENTER | Age: 73
End: 2023-08-28
Payer: MEDICARE

## 2023-08-28 VITALS
BODY MASS INDEX: 30.7 KG/M2 | TEMPERATURE: 99.4 F | SYSTOLIC BLOOD PRESSURE: 120 MMHG | HEART RATE: 66 BPM | OXYGEN SATURATION: 95 % | WEIGHT: 191 LBS | HEIGHT: 66 IN | DIASTOLIC BLOOD PRESSURE: 60 MMHG

## 2023-08-28 DIAGNOSIS — C49.9 LEIOMYOSARCOMA (HCC): ICD-10-CM

## 2023-08-28 DIAGNOSIS — C49.21: ICD-10-CM

## 2023-08-28 DIAGNOSIS — C49.9 MALIGNANT NEOPLASM OF CONNECTIVE AND SOFT TISSUE (HCC): Primary | ICD-10-CM

## 2023-08-28 PROCEDURE — 3074F SYST BP LT 130 MM HG: CPT | Performed by: ORTHOPAEDIC SURGERY

## 2023-08-28 PROCEDURE — 99205 OFFICE O/P NEW HI 60 MIN: CPT | Performed by: ORTHOPAEDIC SURGERY

## 2023-08-28 PROCEDURE — 3078F DIAST BP <80 MM HG: CPT | Performed by: ORTHOPAEDIC SURGERY

## 2023-08-28 RX ORDER — LIDOCAINE 40 MG/G
1 CREAM TOPICAL PRN
Status: ON HOLD | COMMUNITY
End: 2023-11-15

## 2023-08-28 RX ORDER — ALENDRONATE SODIUM 70 MG/1
70 TABLET ORAL
COMMUNITY
Start: 2023-07-10

## 2023-08-28 RX ORDER — ESTRADIOL 0.1 MG/D
1 FILM, EXTENDED RELEASE TRANSDERMAL
COMMUNITY
Start: 2023-07-21

## 2023-08-28 ASSESSMENT — ENCOUNTER SYMPTOMS
COUGH: 0
NERVOUS/ANXIOUS: 0
DEPRESSION: 0
CHILLS: 0
DIZZINESS: 0
HEADACHES: 0
NAUSEA: 0
ABDOMINAL PAIN: 0
TINGLING: 0
DOUBLE VISION: 0
MYALGIAS: 0
BLURRED VISION: 0
FEVER: 0
SHORTNESS OF BREATH: 0

## 2023-08-28 NOTE — PROGRESS NOTES
Subjective:   8/28/2023  2:49 PM  Primary care physician:Caty Cadena P.A.-C.    Chief Complaint:   Chief Complaint   Patient presents with    New Patient     Leiomyosarcoma       History of presenting illness:  Debo Ortiz  is a pleasant 73 y.o. year old female who presented with a right thigh mass.  She reports that the mass was present for approximately 1 year and slowly grew over that time.  She saw her PCP regarding the mass and was eventually referred for an ultrasound guided biopsy which returned as a benign leiomyoma.  She was then referred to Dr. Kim that performed an excision of the mass on 8/4/23 and final pathology resulted as a grade 2 leiomyosarcoma with positive margins.  She has not received any adjuvant treatments.  She developed a seroma postoperatively and had the seroma drained twice in his office with needle aspiration.  She reports that the seroma reaccumulated within 24 hours each time.  She states she has some soreness and achiness in the area and some paresthesias adjacent to the surgical bed, but no significant pain.  She is not taking any medicine for pain.  She denies any numbness or paresthesias down the leg.  She reports she is otherwise felt well with no changes in her overall health.    The patient does not have a personal history of cancer. The patient does have a family history of cancer (sister with lung cancer).  They deny history of radiation. They report history of tobacco use. She quit smoking 6 months ago.     Past Medical History:   Diagnosis Date    Rosacea      Past Surgical History:   Procedure Laterality Date    TUMOR EXCISION WITH BIOPSY Right 08/04/2023    right thigh    ABDOMINAL SUBTOTAL HYSTERECTOMY      OTHER ORTHOPEDIC SURGERY       No Known Allergies  Outpatient Encounter Medications as of 8/28/2023   Medication Sig Dispense Refill    alendronate (FOSAMAX) 70 MG Tab Take 70 mg by mouth.      estradiol (VIVELLE DOT) 0.1 MG/24HR PATCH BIWEEKLY APPLY 1  PATCH TOPICALLY TO THE SKIN 2 TIMES A WEEK      metronidazole (METROCREAM) 0.75 % cream Apply  topically 2 times a day.      lidocaine (LMX) 4 % Cream Apply 1 Application topically one time.      Multiple Vitamins-Minerals (CENTRUM SILVER 50+WOMEN PO) Take  by mouth.      NON SPECIFIED NERVIVE: RELIEFS NERVE ACHES, DISCOMFORT, AND WEAKNESS.  Ingredients: Alpha-Lipoic Acid, B Complex vitamins, tumeric, and daniel       No facility-administered encounter medications on file as of 2023.     Social History     Socioeconomic History    Marital status:      Spouse name: Not on file    Number of children: Not on file    Years of education: Not on file    Highest education level: Not on file   Occupational History    Not on file   Tobacco Use    Smoking status: Former     Current packs/day: 0.00     Types: Cigarettes     Quit date: 2023     Years since quittin.5    Smokeless tobacco: Never   Vaping Use    Vaping Use: Never used   Substance and Sexual Activity    Alcohol use: Not Currently    Drug use: Never    Sexual activity: Not on file   Other Topics Concern    Not on file   Social History Narrative    Not on file     Social Determinants of Health     Financial Resource Strain: Not on file   Food Insecurity: Not on file   Transportation Needs: Not on file   Physical Activity: Not on file   Stress: Not on file   Social Connections: Not on file   Intimate Partner Violence: Not on file   Housing Stability: Not on file      Social History     Tobacco Use   Smoking Status Former    Current packs/day: 0.00    Types: Cigarettes    Quit date: 2023    Years since quittin.5   Smokeless Tobacco Never     Social History     Substance and Sexual Activity   Alcohol Use Not Currently     Social History     Substance and Sexual Activity   Drug Use Never        Family History   Problem Relation Age of Onset    Lung Cancer Sister        Review of Systems   Constitutional:  Negative for chills and fever.  "  HENT:  Negative for hearing loss, nosebleeds and tinnitus.    Eyes:  Negative for blurred vision and double vision.   Respiratory:  Negative for cough and shortness of breath.    Cardiovascular:  Negative for chest pain.   Gastrointestinal:  Negative for abdominal pain and nausea.   Genitourinary:  Negative for dysuria and urgency.   Musculoskeletal:  Positive for joint pain. Negative for myalgias.        Knee pain   Skin:  Negative for rash.   Neurological:  Negative for dizziness, tingling and headaches.   Psychiatric/Behavioral:  Negative for depression. The patient is not nervous/anxious.         Objective:   /60 (BP Location: Left arm, Patient Position: Sitting, BP Cuff Size: Adult)   Pulse 66   Temp 37.4 °C (99.4 °F) (Temporal)   Ht 1.676 m (5' 6\")   Wt 86.6 kg (191 lb)   SpO2 95%   BMI 30.83 kg/m²     Physical Exam  Constitutional:       General: She is not in acute distress.     Appearance: Normal appearance. She is not ill-appearing.   HENT:      Head: Normocephalic and atraumatic.      Right Ear: External ear normal.      Left Ear: External ear normal.      Nose: Nose normal.      Mouth/Throat:      Mouth: Mucous membranes are moist.   Eyes:      Extraocular Movements: Extraocular movements intact.   Cardiovascular:      Rate and Rhythm: Normal rate and regular rhythm.      Pulses: Normal pulses.   Pulmonary:      Effort: No respiratory distress.      Breath sounds: No wheezing.   Abdominal:      General: There is no distension.   Musculoskeletal:      Cervical back: Normal range of motion and neck supple.      Comments: right lower extremity: Palpable that is fluctuant consistent with a seroma in area of prior resection . Overlying skin demonstrates a well-healed transverse scar measuring 9 cm.  SILT spn, dpn, plantar and sural nerves. Motor intact to knee extension, ankle dorsiflexion, ankle plantar flexion, and eversion. DP/PT pulse 2+. There is no palpable lymphadenopathy in the inguinal " tran basin or popliteal fossa.      Skin:     General: Skin is warm and dry.      Capillary Refill: Capillary refill takes less than 2 seconds.   Neurological:      General: No focal deficit present.      Mental Status: She is alert and oriented to person, place, and time.   Psychiatric:         Mood and Affect: Mood normal.         Behavior: Behavior normal.         Thought Content: Thought content normal.           Imaging:  none    Pathology:  Grade 2 Leiomyosarcoma per outside report 8/4/23        Diagnosis:     1. 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 Leiomyosarcoma of right thigh (HCC)        3. Leiomyosarcoma (HCC)                Assessment/Plan:     Debo is a pleasant 73-year-old female with a right thigh leiomyosarcoma she had a resection on 8/4/2023 after a prior biopsy showed a benign histology.  The resection demonstrated positive margins.  I reviewed the patient's diagnosis and exam findings with her in detail as well as the recommended treatment plan.  We discussed soft tissue sarcomas in general as well as her specific histology of leiomyosarcoma.  I have ordered staging studies with a CT scan of the chest and an MRI of the right thigh with and without contrast that are scheduled for next Wednesday.  We discussed the paradigm of treatment for the sarcoma is with radiation and surgical resection.  I have recommended neoadjuvant radiation followed by wide resection of the tumor bed.  We discussed the proximal medial thigh is an area anatomically associated with higher risk of wound complications after surgery.  I also discussed that at the time of tumor bed resection the entire scar and surgical bed needs to be resected and given the nature of the prior incision a large area of skin would need to be removed.  This could require skin grafting or  even flap coverage.  I would plan to do this in a staged fashion, if the skin is unable to be closed I would place a wound VAC after the resection and await final pathology results.  If margins come back negative I would then proceed with skin grafting or flap coverage by plastic surgery the following week.  We also discussed the role for surveillance after resection which will include MRI of the local area and CT scan of the chest every 3 months for the first 2 years, every 6 months for the next 3 years followed by every year until year 10.  This is per NCCN guidelines.  I will plan on presenting her case at the multidisciplinary tumor board after her pathology slides are received for internal review and staging studies completed.  I will see her back for a preoperative appointment after radiation is completed and plan to do the resection approximately 4 weeks after the completion of radiation.  I have also spoken with Dr. Matute (radiation oncology) regarding this patient.  The patient verbalized understanding of the care plan and was in agreement to proceed.  All of her questions were answered.    Referrals: Radiation oncology, Dr. Matute  Restrictions: none, I recommended a gentle Ace wrap over the seroma to help minimize swelling  New medications/refills: None  Imaging studies: MRI with and without contrast right femur and CT scan of the chest without contrast  Follow-up: 3 weeks after completion of radiation for a preoperative appointment      Abdoulaye Sainz DO  Orthopedic Oncologist

## 2023-08-31 ENCOUNTER — PATIENT OUTREACH (OUTPATIENT)
Dept: ONCOLOGY | Facility: MEDICAL CENTER | Age: 73
End: 2023-08-31
Payer: MEDICARE

## 2023-08-31 NOTE — LETTER
[facility]    [address]  [place] [address]  Phone: [phone] - Fax: [phone]              Debo Ortiz,  [facility] [address] [facility]  [place] [address]     Date: 08/31/23    Dear Debo,    I am a Cancer Nurse Navigator, a certified oncology nurse. My role is to assess any needs you may have with education, guidance and support. I am available to you and your family through your treatment at [facility].       I am available to address your needs during your journey with the following services:     Care Coordination  I can assist you in facilitating communication between your cancer care treatment team to ensure timely treatment and follow-up.  I can also assist with transition of care back to your primary care provider, or other specialist, as needed.  My goal is to bridge gaps for you throughout the course of your active treatment.       Education Services  Understanding the recommended treatment course by your physician is key. I can provide educational resources personalized to your cancer diagnosis to help you understand your diagnosis and treatment. Please let me know if you would like to receive information about your diagnosis and treatment plan.  I am here to help.     Support Services/Resource Information  [facility] we offer a full scope of support services.  I can assist you with referral information to:  Cancer Clinical Trials & Research  Nutrition counseling  Support groups  Complementary Therapies such as Mind-Body Techniques Meditation  Patient Financial Advocates    [facility], an [employer] affiliate office, our volunteers can assist you with accessing our [employer] [employer] library, support services information, head coverings and comfort items  Community and national resources, included eligibility based tyler assistance and pharmaceutical access programs, if you are in need of additional information.      [facility] offers services that include:  Behavioral Health  Genetic counseling & testing  Acupuncture  Lymphedema prevention/treatment program  Palliative care services.        I hope you have an excellent patient experience.  Please feel free to share with me your comments regarding the care you have received- we value your feedback.    Sincerely,     Ese Zarate R.N.  Cancer Nurse Navigator    Office: [phone]  Main:  [phone]   Email:  [email or web address]

## 2023-08-31 NOTE — PROGRESS NOTES
"Cancer Nurse Navigation Assessment:      Assessment/Discussion: Call placed to patient, introduced self and explained role of navigator.  When asked she reports has her MRI and CT scan scheduled for next Wednesday and see Dr Matute, radiation oncologist following Monday (Sept 11th).  Pt reports her insurance did pre-approve her MR and CT.  Answered additional questions.  Pt denies further questions or barriers to care at this time.  She requested contact information be emailed to her.  Email address provided, Epic updated.    TRANSPORTATION: Pt reports drives self, no concerns unless she is not able to drive self  FINANCIAL:  Pt does have REPUCOM for many things with [employer]  SUPPORT SYSTEM:  Pt states no local support, but has family and friends she calls and are supportive  PSYCHOLOGICAL:   Pt stating she's \"not worried\" and denies need for additional support   DST:  2 administered in surgical oncology office    Resources Provided/Intervention: Introduction letter, cancer support service calendar and business card mailed to patient.  Sent letter and support calendar via email per patient request.         "

## 2023-09-11 ENCOUNTER — HOSPITAL ENCOUNTER (OUTPATIENT)
Dept: RADIOLOGY | Facility: MEDICAL CENTER | Age: 73
End: 2023-09-11
Payer: MEDICARE

## 2023-09-14 ENCOUNTER — HOSPITAL ENCOUNTER (OUTPATIENT)
Facility: MEDICAL CENTER | Age: 73
End: 2023-09-14
Attending: ORTHOPAEDIC SURGERY
Payer: MEDICARE

## 2023-09-14 ENCOUNTER — TELEPHONE (OUTPATIENT)
Dept: SURGICAL ONCOLOGY | Facility: MEDICAL CENTER | Age: 73
End: 2023-09-14
Payer: MEDICARE

## 2023-09-14 DIAGNOSIS — C49.21: Primary | ICD-10-CM

## 2023-09-14 NOTE — TELEPHONE ENCOUNTER
I called Debo to discuss the results of her staging scans and the discussion that was held at the multidisciplinary tumor board.  She has no evidence of disease in her chest, but an incidental finding of an adrenal nodule and small liver mass were found.  The liver mass is favored to be a cyst or benign hemangioma.  The multidisciplinary team recommended CT abdomen and pelvis with contrast for further evaluation of these 2 incidental findings.  I have ordered this test.  Additionally she is scheduled to start radiation next week and her last radiation therapy should be the week of October 20.  I have also ordered restaging CT chest without contrast and MRI of the right femur with and without contrast that should be completed sometime between October 23 and November 3.  I will see her the following week for a preoperative appointment and surgery will be scheduled for the week of November 13.  All of her questions were answered, she signaled understanding and was in agreement with the plan.

## 2023-09-14 NOTE — PROGRESS NOTES
Cancer Conference:   Conference Date: 9/14/23    Presenter: MD Debo Burgess disease status and treatment plans were discussed at multi-disciplinary   cancer conference. This included review of current patient assessments, updated imaging, and   available pathology results. Other relevant factors considered, including as medical   oncology genetics, nurse navigation, and social work.    Current Diagnosis:      73-year-old female with a right thigh leiomyosarcoma she had a resection on 8/4/2023 after a prior biopsy showed a benign histology. Pathology demonstrated positive margins.     Updated Assessment / Status / Plan:     Plan is to order staging including CT of abdomen and pelvis

## 2023-09-18 LAB — PATHOLOGY CONSULT NOTE: NORMAL

## 2023-10-31 ENCOUNTER — APPOINTMENT (OUTPATIENT)
Dept: ADMISSIONS | Facility: MEDICAL CENTER | Age: 73
End: 2023-10-31
Attending: ORTHOPAEDIC SURGERY
Payer: MEDICARE

## 2023-11-07 NOTE — DISCHARGE PLANNING
Per Uma at Dr. Sainz's office, Syl with [employer] has referral for wound vac and hoping to get it delivered day prior to procedure. No need for dressing changes as patient will leave on for one week.

## 2023-11-08 ENCOUNTER — PRE-ADMISSION TESTING (OUTPATIENT)
Dept: ADMISSIONS | Facility: MEDICAL CENTER | Age: 73
End: 2023-11-08
Attending: ORTHOPAEDIC SURGERY
Payer: MEDICARE

## 2023-11-08 DIAGNOSIS — Z01.812 PRE-OPERATIVE LABORATORY EXAMINATION: ICD-10-CM

## 2023-11-09 ENCOUNTER — HOSPITAL ENCOUNTER (OUTPATIENT)
Dept: RADIOLOGY | Facility: MEDICAL CENTER | Age: 73
End: 2023-11-09
Attending: ORTHOPAEDIC SURGERY

## 2023-11-09 ENCOUNTER — OFFICE VISIT (OUTPATIENT)
Dept: SURGICAL ONCOLOGY | Facility: MEDICAL CENTER | Age: 73
End: 2023-11-09
Payer: MEDICARE

## 2023-11-09 VITALS
WEIGHT: 186 LBS | TEMPERATURE: 97.6 F | DIASTOLIC BLOOD PRESSURE: 60 MMHG | BODY MASS INDEX: 30.02 KG/M2 | OXYGEN SATURATION: 96 % | SYSTOLIC BLOOD PRESSURE: 108 MMHG | HEART RATE: 57 BPM

## 2023-11-09 DIAGNOSIS — C49.21: ICD-10-CM

## 2023-11-09 PROCEDURE — 3074F SYST BP LT 130 MM HG: CPT | Performed by: ORTHOPAEDIC SURGERY

## 2023-11-09 PROCEDURE — 99215 OFFICE O/P EST HI 40 MIN: CPT | Performed by: ORTHOPAEDIC SURGERY

## 2023-11-09 PROCEDURE — 3078F DIAST BP <80 MM HG: CPT | Performed by: ORTHOPAEDIC SURGERY

## 2023-11-09 RX ORDER — SILVER SULFADIAZINE 1 %
CREAM (GRAM) TOPICAL
COMMUNITY
Start: 2023-10-19

## 2023-11-09 ASSESSMENT — ENCOUNTER SYMPTOMS
NERVOUS/ANXIOUS: 0
CHILLS: 0
ABDOMINAL PAIN: 0
NAUSEA: 0
DOUBLE VISION: 0
COUGH: 0
FEVER: 0
SHORTNESS OF BREATH: 0
DIZZINESS: 0
DEPRESSION: 0
MYALGIAS: 0
TINGLING: 0
BLURRED VISION: 0
HEADACHES: 0
SENSORY CHANGE: 1

## 2023-11-09 NOTE — PROGRESS NOTES
Subjective:   11/9/2023  9:44 AM  Primary care physician:Caty Cadena P.A.-C.    Chief Complaint: Pre-Op evaluation/discuss re-staging scan    History of presenting illness: 8/28/23  Debo Ortiz  is a pleasant 73 y.o. year old female who presented with a right thigh mass.  She reports that the mass was present for approximately 1 year and slowly grew over that time.  She saw her PCP regarding the mass and was eventually referred for an ultrasound guided biopsy which returned as a benign leiomyoma.  She was then referred to Dr. Kim that performed an excision of the mass on 8/4/23 and final pathology resulted as a grade 2 leiomyosarcoma with positive margins.  She has not received any adjuvant treatments.  She developed a seroma postoperatively and had the seroma drained twice in his office with needle aspiration.  She reports that the seroma reaccumulated within 24 hours each time.  She states she has some soreness and achiness in the area and some paresthesias adjacent to the surgical bed, but no significant pain.  She is not taking any medicine for pain.  She denies any numbness or paresthesias down the leg.  She reports she is otherwise felt well with no changes in her overall health.    The patient does have a personal history of cancer. The patient does have a family history of cancer (sister with lung cancer).  They deny history of radiation. They report history of tobacco use. She quit smoking 6 months ago.     Update 11/9:  Debo returns today for a preop appointment and to discuss restaging scans. She completed neoadjuvant radiation on 10/24/23. She notes she had some skin erythema, but otherwise tolerated radiation well. She notes the seroma has not changed in size. She denies any other concerns.     Past Medical History:   Diagnosis Date    Cancer (HCC)     Leiomyosarcoma    Cataract     IOL OU    Leiomyosarcoma (HCC)     Rosacea      Past Surgical History:   Procedure Laterality Date    TUMOR  EXCISION WITH BIOPSY Right 2023    right thigh    ABDOMINAL SUBTOTAL HYSTERECTOMY      CATARACT EXTRACTION WITH IOL Bilateral     OTHER ORTHOPEDIC SURGERY      Bilateral Feet     No Known Allergies  Outpatient Encounter Medications as of 2023   Medication Sig Dispense Refill    SSD 1 % Cream APPLY TOPICALLY TO RIGHT GROIN THREE TIMES DAILY      Apoaequorin (PREVAGEN PO) Take  by mouth every day.      Wheat Dextrin (BENEFIBER PO) Take  by mouth every day.      alendronate (FOSAMAX) 70 MG Tab Take 70 mg by mouth every 7 days.      estradiol (VIVELLE DOT) 0.1 MG/24HR PATCH BIWEEKLY APPLY 1 PATCH TOPICALLY TO THE SKIN 2 TIMES A WEEK      metronidazole (METROCREAM) 0.75 % cream Apply  topically every day.      lidocaine (LMX) 4 % Cream Apply 1 Application topically as needed.      Multiple Vitamins-Minerals (CENTRUM SILVER 50+WOMEN PO) Take  by mouth every day.      NON SPECIFIED NERVIVE: RELIEFS NERVE ACHES, DISCOMFORT, AND WEAKNESS.  Ingredients: Alpha-Lipoic Acid, B Complex vitamins, tumeric, and ginger       No facility-administered encounter medications on file as of 2023.     Social History     Socioeconomic History    Marital status:      Spouse name: Not on file    Number of children: Not on file    Years of education: Not on file    Highest education level: Not on file   Occupational History    Not on file   Tobacco Use    Smoking status: Former     Current packs/day: 0.00     Types: Cigarettes     Quit date: 2023     Years since quittin.7    Smokeless tobacco: Never   Vaping Use    Vaping Use: Never used   Substance and Sexual Activity    Alcohol use: Not Currently    Drug use: Never    Sexual activity: Not on file   Other Topics Concern    Not on file   Social History Narrative    Not on file     Social Determinants of Health     Financial Resource Strain: Not on file   Food Insecurity: Not on file   Transportation Needs: Not on file   Physical Activity: Not on file   Stress:  Not on file   Social Connections: Not on file   Intimate Partner Violence: Not on file   Housing Stability: Not on file      Social History     Tobacco Use   Smoking Status Former    Current packs/day: 0.00    Types: Cigarettes    Quit date: 2023    Years since quittin.7   Smokeless Tobacco Never     Social History     Substance and Sexual Activity   Alcohol Use Not Currently     Social History     Substance and Sexual Activity   Drug Use Never        Family History   Problem Relation Age of Onset    Lung Cancer Sister        Review of Systems   Constitutional:  Negative for chills and fever.   HENT:  Negative for hearing loss, nosebleeds and tinnitus.    Eyes:  Negative for blurred vision and double vision.   Respiratory:  Negative for cough and shortness of breath.    Cardiovascular:  Negative for chest pain.   Gastrointestinal:  Negative for abdominal pain and nausea.   Genitourinary:  Negative for dysuria and urgency.   Musculoskeletal:  Positive for joint pain. Negative for myalgias.        Knee pain   Skin:  Negative for rash.   Neurological:  Positive for sensory change. Negative for dizziness, tingling and headaches.        Decreased sensation in saphenous nerve distribution right thigh   Psychiatric/Behavioral:  Negative for depression. The patient is not nervous/anxious.         Objective:   /60 (BP Location: Left arm, Patient Position: Sitting, BP Cuff Size: Adult)   Pulse (!) 57   Temp 36.4 °C (97.6 °F) (Temporal)   Wt 84.4 kg (186 lb)   SpO2 96%   BMI 30.02 kg/m²     Physical Exam  Constitutional:       General: She is not in acute distress.     Appearance: Normal appearance. She is not ill-appearing.   HENT:      Head: Normocephalic and atraumatic.      Right Ear: External ear normal.      Left Ear: External ear normal.      Nose: Nose normal.      Mouth/Throat:      Mouth: Mucous membranes are moist.   Eyes:      Extraocular Movements: Extraocular movements intact.   Cardiovascular:       Rate and Rhythm: Normal rate and regular rhythm.      Pulses: Normal pulses.   Pulmonary:      Effort: No respiratory distress.      Breath sounds: No wheezing.   Abdominal:      General: There is no distension.   Musculoskeletal:      Cervical back: Normal range of motion and neck supple.      Comments: right lower extremity: Palpable mass that is fluctuant consistent with a seroma in area of prior resection . Overlying skin demonstrates a well-healed transverse scar measuring 9 cm, there is surrounding erythema from radiation burn and minimal superficial desquamation.  SILT spn, dpn, plantar and sural nerves. Motor intact to knee extension, ankle dorsiflexion, ankle plantar flexion, and eversion. DP/PT pulse 2+.      Skin:     General: Skin is warm and dry.      Capillary Refill: Capillary refill takes less than 2 seconds.   Neurological:      General: No focal deficit present.      Mental Status: She is alert and oriented to person, place, and time.   Psychiatric:         Mood and Affect: Mood normal.         Behavior: Behavior normal.         Thought Content: Thought content normal.           Imaging:  CT chest without contrast 10/27/2023  Per my read demonstrates no pulmonary nodules or masses concerning for metastatic disease.  There are several small blebs.    CT abdomen pelvis 11/3/2023  Per my read demonstrates stable cystic appearing hepatic lesion measuring approximately 6 mm.  Additionally there were several adrenal nodules noted bilaterally.  Per report radiology recommended continued surveillance.    MRI right femur with and without contrast 10/27/2023  Per my read demonstrates a uniformly T1 hypointense and T2 hyperintense mass in the right medial proximal thigh measuring approximately 8.5 cm x 7 cm x 8 cm.  The mass demonstrates rim enhancement postcontrast consistent with seroma in the area of prior surgical resection.  There is minimal enhancement adjacent to the mass.    Pathology:  Grade 2  Leiomyosarcoma per outside report 8/4/23        Diagnosis:     1. Leiomyosarcoma of right thigh (HCC)                Assessment/Plan:     I reviewed Deysi's restaging scans with her in detail today.  She is scheduled for surgery with wide resection of her tumor bed next Wednesday, 11/15/2023.  We discussed that if we are unable to close the skin a wound VAC will be placed and we will wait for final margin status before going back to surgery for coverage.  Otherwise if we are able to close the wound I will place drains to help prevent further seroma formation.  We discussed the plan for resection in detail including taking muscular fascia as the deep margin.  She verbalized understanding and wished to proceed.  A full PARQ was held with the patient. Both surgical and non-operative options were discussed. They decided on surgery through shared decision making. We discussed risks include infection, blood loss, DVT/PE, damage to neurovascular structures, chronic pain, recurrence, metastasis, need for further surgery and unforeseeable events inherent to medical care. She understands this particular anatomic location is higher risk of wound complications. They also understand anesthesia will do a separate consent discussing risks inherent to anesthesia.They verbalized understanding and were in agreement to proceed with wide resection of right proximal thigh soft tissue sarcoma tumor bed.    Postoperatively she will undergo high risk surveillance with new scans every 3 months for the first 2 years followed by every 6 months until year 5 and then annually until year 10.      Referrals: none, established with Dr. Neri (rad onc)  Restrictions: none  New medications/refills: None  Imaging studies: none  Follow-up: post op      Abdoulaye Sainz DO  Orthopedic Oncologist

## 2023-11-13 ENCOUNTER — PRE-ADMISSION TESTING (OUTPATIENT)
Dept: ADMISSIONS | Facility: MEDICAL CENTER | Age: 73
End: 2023-11-13
Attending: ORTHOPAEDIC SURGERY
Payer: MEDICARE

## 2023-11-13 DIAGNOSIS — Z01.812 PRE-OPERATIVE LABORATORY EXAMINATION: ICD-10-CM

## 2023-11-13 LAB
ABO GROUP BLD: NORMAL
ALBUMIN SERPL BCP-MCNC: 4.4 G/DL (ref 3.2–4.9)
ALBUMIN/GLOB SERPL: 1.6 G/DL
ALP SERPL-CCNC: 86 U/L (ref 30–99)
ALT SERPL-CCNC: 11 U/L (ref 2–50)
ANION GAP SERPL CALC-SCNC: 10 MMOL/L (ref 7–16)
AST SERPL-CCNC: 15 U/L (ref 12–45)
BASOPHILS # BLD AUTO: 0.8 % (ref 0–1.8)
BASOPHILS # BLD: 0.04 K/UL (ref 0–0.12)
BILIRUB SERPL-MCNC: 0.3 MG/DL (ref 0.1–1.5)
BLD GP AB SCN SERPL QL: NORMAL
BUN SERPL-MCNC: 15 MG/DL (ref 8–22)
CALCIUM ALBUM COR SERPL-MCNC: 8.9 MG/DL (ref 8.5–10.5)
CALCIUM SERPL-MCNC: 9.2 MG/DL (ref 8.5–10.5)
CHLORIDE SERPL-SCNC: 101 MMOL/L (ref 96–112)
CO2 SERPL-SCNC: 24 MMOL/L (ref 20–33)
CREAT SERPL-MCNC: 0.7 MG/DL (ref 0.5–1.4)
EOSINOPHIL # BLD AUTO: 0.06 K/UL (ref 0–0.51)
EOSINOPHIL NFR BLD: 1.2 % (ref 0–6.9)
ERYTHROCYTE [DISTWIDTH] IN BLOOD BY AUTOMATED COUNT: 48.5 FL (ref 35.9–50)
GFR SERPLBLD CREATININE-BSD FMLA CKD-EPI: 92 ML/MIN/1.73 M 2
GLOBULIN SER CALC-MCNC: 2.7 G/DL (ref 1.9–3.5)
GLUCOSE SERPL-MCNC: 83 MG/DL (ref 65–99)
HCT VFR BLD AUTO: 37.2 % (ref 37–47)
HGB BLD-MCNC: 12.3 G/DL (ref 12–16)
IMM GRANULOCYTES # BLD AUTO: 0.01 K/UL (ref 0–0.11)
IMM GRANULOCYTES NFR BLD AUTO: 0.2 % (ref 0–0.9)
INR PPP: 1 (ref 0.87–1.13)
LYMPHOCYTES # BLD AUTO: 0.93 K/UL (ref 1–4.8)
LYMPHOCYTES NFR BLD: 18.2 % (ref 22–41)
MCH RBC QN AUTO: 31.9 PG (ref 27–33)
MCHC RBC AUTO-ENTMCNC: 33.1 G/DL (ref 32.2–35.5)
MCV RBC AUTO: 96.4 FL (ref 81.4–97.8)
MONOCYTES # BLD AUTO: 0.44 K/UL (ref 0–0.85)
MONOCYTES NFR BLD AUTO: 8.6 % (ref 0–13.4)
NEUTROPHILS # BLD AUTO: 3.62 K/UL (ref 1.82–7.42)
NEUTROPHILS NFR BLD: 71 % (ref 44–72)
NRBC # BLD AUTO: 0 K/UL
NRBC BLD-RTO: 0 /100 WBC (ref 0–0.2)
PLATELET # BLD AUTO: 253 K/UL (ref 164–446)
PMV BLD AUTO: 11.1 FL (ref 9–12.9)
POTASSIUM SERPL-SCNC: 4.1 MMOL/L (ref 3.6–5.5)
PROT SERPL-MCNC: 7.1 G/DL (ref 6–8.2)
PROTHROMBIN TIME: 13.3 SEC (ref 12–14.6)
RBC # BLD AUTO: 3.86 M/UL (ref 4.2–5.4)
RH BLD: NORMAL
SODIUM SERPL-SCNC: 135 MMOL/L (ref 135–145)
WBC # BLD AUTO: 5.1 K/UL (ref 4.8–10.8)

## 2023-11-13 PROCEDURE — 80053 COMPREHEN METABOLIC PANEL: CPT

## 2023-11-13 PROCEDURE — 86900 BLOOD TYPING SEROLOGIC ABO: CPT

## 2023-11-13 PROCEDURE — 86850 RBC ANTIBODY SCREEN: CPT

## 2023-11-13 PROCEDURE — 86901 BLOOD TYPING SEROLOGIC RH(D): CPT

## 2023-11-13 PROCEDURE — 36415 COLL VENOUS BLD VENIPUNCTURE: CPT

## 2023-11-13 PROCEDURE — 85610 PROTHROMBIN TIME: CPT

## 2023-11-13 PROCEDURE — 85025 COMPLETE CBC W/AUTO DIFF WBC: CPT

## 2023-11-13 PROCEDURE — 93005 ELECTROCARDIOGRAM TRACING: CPT

## 2023-11-14 ENCOUNTER — ANESTHESIA EVENT (OUTPATIENT)
Dept: SURGERY | Facility: MEDICAL CENTER | Age: 73
End: 2023-11-14
Payer: MEDICARE

## 2023-11-14 LAB — EKG IMPRESSION: NORMAL

## 2023-11-14 PROCEDURE — 93010 ELECTROCARDIOGRAM REPORT: CPT | Performed by: INTERNAL MEDICINE

## 2023-11-15 ENCOUNTER — HOSPITAL ENCOUNTER (OUTPATIENT)
Facility: MEDICAL CENTER | Age: 73
End: 2023-11-16
Attending: ORTHOPAEDIC SURGERY | Admitting: ORTHOPAEDIC SURGERY
Payer: MEDICARE

## 2023-11-15 ENCOUNTER — ANESTHESIA (OUTPATIENT)
Dept: SURGERY | Facility: MEDICAL CENTER | Age: 73
End: 2023-11-15
Payer: MEDICARE

## 2023-11-15 DIAGNOSIS — C49.9 LEIOMYOSARCOMA (HCC): ICD-10-CM

## 2023-11-15 PROBLEM — F17.210 CIGARETTE NICOTINE DEPENDENCE WITHOUT COMPLICATION: Status: ACTIVE | Noted: 2023-11-15

## 2023-11-15 PROBLEM — Z79.890 POSTMENOPAUSAL HRT (HORMONE REPLACEMENT THERAPY): Status: ACTIVE | Noted: 2023-11-15

## 2023-11-15 LAB
ABO + RH BLD: NORMAL
PATHOLOGY CONSULT NOTE: NORMAL

## 2023-11-15 PROCEDURE — A9270 NON-COVERED ITEM OR SERVICE: HCPCS | Performed by: ANESTHESIOLOGY

## 2023-11-15 PROCEDURE — 770001 HCHG ROOM/CARE - MED/SURG/GYN PRIV*

## 2023-11-15 PROCEDURE — 160002 HCHG RECOVERY MINUTES (STAT): Performed by: ORTHOPAEDIC SURGERY

## 2023-11-15 PROCEDURE — 160035 HCHG PACU - 1ST 60 MINS PHASE I: Performed by: ORTHOPAEDIC SURGERY

## 2023-11-15 PROCEDURE — 700111 HCHG RX REV CODE 636 W/ 250 OVERRIDE (IP): Performed by: ANESTHESIOLOGY

## 2023-11-15 PROCEDURE — 700105 HCHG RX REV CODE 258: Performed by: ORTHOPAEDIC SURGERY

## 2023-11-15 PROCEDURE — 160038 HCHG SURGERY MINUTES - EA ADDL 1 MIN LEVEL 2: Performed by: ORTHOPAEDIC SURGERY

## 2023-11-15 PROCEDURE — 700102 HCHG RX REV CODE 250 W/ 637 OVERRIDE(OP): Performed by: ORTHOPAEDIC SURGERY

## 2023-11-15 PROCEDURE — A9270 NON-COVERED ITEM OR SERVICE: HCPCS | Performed by: SPECIALIST

## 2023-11-15 PROCEDURE — 700111 HCHG RX REV CODE 636 W/ 250 OVERRIDE (IP): Performed by: ORTHOPAEDIC SURGERY

## 2023-11-15 PROCEDURE — 700102 HCHG RX REV CODE 250 W/ 637 OVERRIDE(OP): Performed by: SPECIALIST

## 2023-11-15 PROCEDURE — 88309 TISSUE EXAM BY PATHOLOGIST: CPT

## 2023-11-15 PROCEDURE — 700102 HCHG RX REV CODE 250 W/ 637 OVERRIDE(OP): Performed by: ANESTHESIOLOGY

## 2023-11-15 PROCEDURE — 160036 HCHG PACU - EA ADDL 30 MINS PHASE I: Performed by: ORTHOPAEDIC SURGERY

## 2023-11-15 PROCEDURE — 36415 COLL VENOUS BLD VENIPUNCTURE: CPT

## 2023-11-15 PROCEDURE — 27364 RESECT THIGH/KNEE TUM 5 CM/>: CPT | Mod: AS,RT | Performed by: SPECIALIST

## 2023-11-15 PROCEDURE — 700111 HCHG RX REV CODE 636 W/ 250 OVERRIDE (IP): Mod: JZ | Performed by: ANESTHESIOLOGY

## 2023-11-15 PROCEDURE — A9270 NON-COVERED ITEM OR SERVICE: HCPCS | Performed by: ORTHOPAEDIC SURGERY

## 2023-11-15 PROCEDURE — 27364 RESECT THIGH/KNEE TUM 5 CM/>: CPT | Mod: RT | Performed by: ORTHOPAEDIC SURGERY

## 2023-11-15 PROCEDURE — 160027 HCHG SURGERY MINUTES - 1ST 30 MINS LEVEL 2: Performed by: ORTHOPAEDIC SURGERY

## 2023-11-15 PROCEDURE — 160009 HCHG ANES TIME/MIN: Performed by: ORTHOPAEDIC SURGERY

## 2023-11-15 PROCEDURE — 160048 HCHG OR STATISTICAL LEVEL 1-5: Performed by: ORTHOPAEDIC SURGERY

## 2023-11-15 PROCEDURE — 110371 HCHG SHELL REV 272: Performed by: ORTHOPAEDIC SURGERY

## 2023-11-15 PROCEDURE — 700101 HCHG RX REV CODE 250: Performed by: ANESTHESIOLOGY

## 2023-11-15 RX ORDER — ACETAMINOPHEN 500 MG
1000 TABLET ORAL EVERY 6 HOURS
Status: DISCONTINUED | OUTPATIENT
Start: 2023-11-15 | End: 2023-11-16 | Stop reason: HOSPADM

## 2023-11-15 RX ORDER — KETOROLAC TROMETHAMINE 30 MG/ML
15 INJECTION, SOLUTION INTRAMUSCULAR; INTRAVENOUS EVERY 6 HOURS
Status: DISCONTINUED | OUTPATIENT
Start: 2023-11-15 | End: 2023-11-16 | Stop reason: HOSPADM

## 2023-11-15 RX ORDER — ASPIRIN 325 MG
325 TABLET ORAL 2 TIMES DAILY
Status: DISCONTINUED | OUTPATIENT
Start: 2023-11-16 | End: 2023-11-16 | Stop reason: HOSPADM

## 2023-11-15 RX ORDER — ONDANSETRON 2 MG/ML
INJECTION INTRAMUSCULAR; INTRAVENOUS PRN
Status: DISCONTINUED | OUTPATIENT
Start: 2023-11-15 | End: 2023-11-15 | Stop reason: SURG

## 2023-11-15 RX ORDER — POLYETHYLENE GLYCOL 3350 17 G/17G
1 POWDER, FOR SOLUTION ORAL
Status: DISCONTINUED | OUTPATIENT
Start: 2023-11-15 | End: 2023-11-16 | Stop reason: HOSPADM

## 2023-11-15 RX ORDER — MIDAZOLAM HYDROCHLORIDE 1 MG/ML
INJECTION INTRAMUSCULAR; INTRAVENOUS PRN
Status: DISCONTINUED | OUTPATIENT
Start: 2023-11-15 | End: 2023-11-15 | Stop reason: SURG

## 2023-11-15 RX ORDER — ACETAMINOPHEN 500 MG
1000 TABLET ORAL ONCE
Status: COMPLETED | OUTPATIENT
Start: 2023-11-15 | End: 2023-11-15

## 2023-11-15 RX ORDER — HYDROMORPHONE HYDROCHLORIDE 1 MG/ML
0.2 INJECTION, SOLUTION INTRAMUSCULAR; INTRAVENOUS; SUBCUTANEOUS
Status: DISCONTINUED | OUTPATIENT
Start: 2023-11-15 | End: 2023-11-15 | Stop reason: HOSPADM

## 2023-11-15 RX ORDER — OXYCODONE HCL 10 MG/1
TABLET, FILM COATED, EXTENDED RELEASE ORAL
Status: DISPENSED
Start: 2023-11-15 | End: 2023-11-15

## 2023-11-15 RX ORDER — ONDANSETRON 2 MG/ML
4 INJECTION INTRAMUSCULAR; INTRAVENOUS EVERY 4 HOURS PRN
Status: DISCONTINUED | OUTPATIENT
Start: 2023-11-15 | End: 2023-11-16 | Stop reason: HOSPADM

## 2023-11-15 RX ORDER — CEFUROXIME SODIUM 1.5 G/16ML
INJECTION, POWDER, FOR SOLUTION INTRAVENOUS
Status: DISCONTINUED | OUTPATIENT
Start: 2023-11-15 | End: 2023-11-15 | Stop reason: HOSPADM

## 2023-11-15 RX ORDER — ACETAMINOPHEN 500 MG
TABLET ORAL
Status: DISPENSED
Start: 2023-11-15 | End: 2023-11-15

## 2023-11-15 RX ORDER — CEFAZOLIN SODIUM 1 G/3ML
INJECTION, POWDER, FOR SOLUTION INTRAMUSCULAR; INTRAVENOUS PRN
Status: DISCONTINUED | OUTPATIENT
Start: 2023-11-15 | End: 2023-11-15 | Stop reason: SURG

## 2023-11-15 RX ORDER — BACITRACIN ZINC 500 [USP'U]/G
OINTMENT TOPICAL
Status: DISCONTINUED | OUTPATIENT
Start: 2023-11-15 | End: 2023-11-15 | Stop reason: HOSPADM

## 2023-11-15 RX ORDER — HALOPERIDOL 5 MG/ML
1 INJECTION INTRAMUSCULAR
Status: DISCONTINUED | OUTPATIENT
Start: 2023-11-15 | End: 2023-11-15 | Stop reason: HOSPADM

## 2023-11-15 RX ORDER — ESTRADIOL 0.1 MG/D
1 FILM, EXTENDED RELEASE TRANSDERMAL
Status: DISCONTINUED | OUTPATIENT
Start: 2023-11-16 | End: 2023-11-16 | Stop reason: HOSPADM

## 2023-11-15 RX ORDER — HYDROMORPHONE HYDROCHLORIDE 1 MG/ML
0.4 INJECTION, SOLUTION INTRAMUSCULAR; INTRAVENOUS; SUBCUTANEOUS
Status: DISCONTINUED | OUTPATIENT
Start: 2023-11-15 | End: 2023-11-15 | Stop reason: HOSPADM

## 2023-11-15 RX ORDER — MEPERIDINE HYDROCHLORIDE 25 MG/ML
6.25 INJECTION INTRAMUSCULAR; INTRAVENOUS; SUBCUTANEOUS
Status: DISCONTINUED | OUTPATIENT
Start: 2023-11-15 | End: 2023-11-15 | Stop reason: HOSPADM

## 2023-11-15 RX ORDER — HYDROMORPHONE HYDROCHLORIDE 1 MG/ML
0.25 INJECTION, SOLUTION INTRAMUSCULAR; INTRAVENOUS; SUBCUTANEOUS
Status: DISCONTINUED | OUTPATIENT
Start: 2023-11-15 | End: 2023-11-16 | Stop reason: HOSPADM

## 2023-11-15 RX ORDER — AMOXICILLIN 250 MG
2 CAPSULE ORAL 2 TIMES DAILY
Status: DISCONTINUED | OUTPATIENT
Start: 2023-11-15 | End: 2023-11-16 | Stop reason: HOSPADM

## 2023-11-15 RX ORDER — DEXAMETHASONE SODIUM PHOSPHATE 4 MG/ML
INJECTION, SOLUTION INTRA-ARTICULAR; INTRALESIONAL; INTRAMUSCULAR; INTRAVENOUS; SOFT TISSUE PRN
Status: DISCONTINUED | OUTPATIENT
Start: 2023-11-15 | End: 2023-11-15 | Stop reason: SURG

## 2023-11-15 RX ORDER — DIPHENHYDRAMINE HYDROCHLORIDE 50 MG/ML
12.5 INJECTION INTRAMUSCULAR; INTRAVENOUS
Status: DISCONTINUED | OUTPATIENT
Start: 2023-11-15 | End: 2023-11-15 | Stop reason: HOSPADM

## 2023-11-15 RX ORDER — HYDROMORPHONE HYDROCHLORIDE 1 MG/ML
0.1 INJECTION, SOLUTION INTRAMUSCULAR; INTRAVENOUS; SUBCUTANEOUS
Status: DISCONTINUED | OUTPATIENT
Start: 2023-11-15 | End: 2023-11-15 | Stop reason: HOSPADM

## 2023-11-15 RX ORDER — BISACODYL 5 MG
5 TABLET, DELAYED RELEASE (ENTERIC COATED) ORAL
COMMUNITY

## 2023-11-15 RX ORDER — SODIUM CHLORIDE, SODIUM LACTATE, POTASSIUM CHLORIDE, CALCIUM CHLORIDE 600; 310; 30; 20 MG/100ML; MG/100ML; MG/100ML; MG/100ML
INJECTION, SOLUTION INTRAVENOUS CONTINUOUS
Status: ACTIVE | OUTPATIENT
Start: 2023-11-15 | End: 2023-11-15

## 2023-11-15 RX ORDER — OXYCODONE HYDROCHLORIDE 5 MG/1
2.5 TABLET ORAL
Status: DISCONTINUED | OUTPATIENT
Start: 2023-11-15 | End: 2023-11-16 | Stop reason: HOSPADM

## 2023-11-15 RX ORDER — SODIUM CHLORIDE, SODIUM LACTATE, POTASSIUM CHLORIDE, CALCIUM CHLORIDE 600; 310; 30; 20 MG/100ML; MG/100ML; MG/100ML; MG/100ML
INJECTION, SOLUTION INTRAVENOUS CONTINUOUS
Status: DISCONTINUED | OUTPATIENT
Start: 2023-11-15 | End: 2023-11-15 | Stop reason: HOSPADM

## 2023-11-15 RX ORDER — ACETAMINOPHEN 500 MG
1000 TABLET ORAL EVERY 6 HOURS PRN
Status: DISCONTINUED | OUTPATIENT
Start: 2023-11-20 | End: 2023-11-16 | Stop reason: HOSPADM

## 2023-11-15 RX ORDER — HYDROMORPHONE HYDROCHLORIDE 2 MG/ML
INJECTION, SOLUTION INTRAMUSCULAR; INTRAVENOUS; SUBCUTANEOUS PRN
Status: DISCONTINUED | OUTPATIENT
Start: 2023-11-15 | End: 2023-11-15 | Stop reason: SURG

## 2023-11-15 RX ORDER — SODIUM CHLORIDE, SODIUM LACTATE, POTASSIUM CHLORIDE, CALCIUM CHLORIDE 600; 310; 30; 20 MG/100ML; MG/100ML; MG/100ML; MG/100ML
INJECTION, SOLUTION INTRAVENOUS CONTINUOUS
Status: DISCONTINUED | OUTPATIENT
Start: 2023-11-15 | End: 2023-11-16 | Stop reason: HOSPADM

## 2023-11-15 RX ORDER — OXYCODONE HCL 10 MG/1
10 TABLET, FILM COATED, EXTENDED RELEASE ORAL ONCE
Status: COMPLETED | OUTPATIENT
Start: 2023-11-15 | End: 2023-11-15

## 2023-11-15 RX ORDER — LIDOCAINE HYDROCHLORIDE 20 MG/ML
INJECTION, SOLUTION EPIDURAL; INFILTRATION; INTRACAUDAL; PERINEURAL PRN
Status: DISCONTINUED | OUTPATIENT
Start: 2023-11-15 | End: 2023-11-15 | Stop reason: SURG

## 2023-11-15 RX ORDER — ONDANSETRON 2 MG/ML
4 INJECTION INTRAMUSCULAR; INTRAVENOUS
Status: DISCONTINUED | OUTPATIENT
Start: 2023-11-15 | End: 2023-11-15 | Stop reason: HOSPADM

## 2023-11-15 RX ORDER — BISACODYL 10 MG
10 SUPPOSITORY, RECTAL RECTAL
Status: DISCONTINUED | OUTPATIENT
Start: 2023-11-15 | End: 2023-11-16 | Stop reason: HOSPADM

## 2023-11-15 RX ORDER — OXYCODONE HYDROCHLORIDE 5 MG/1
5 TABLET ORAL
Status: DISCONTINUED | OUTPATIENT
Start: 2023-11-15 | End: 2023-11-16 | Stop reason: HOSPADM

## 2023-11-15 RX ORDER — EPHEDRINE SULFATE 50 MG/ML
5 INJECTION, SOLUTION INTRAVENOUS
Status: DISCONTINUED | OUTPATIENT
Start: 2023-11-15 | End: 2023-11-15 | Stop reason: HOSPADM

## 2023-11-15 RX ORDER — HYDRALAZINE HYDROCHLORIDE 20 MG/ML
5 INJECTION INTRAMUSCULAR; INTRAVENOUS
Status: DISCONTINUED | OUTPATIENT
Start: 2023-11-15 | End: 2023-11-15 | Stop reason: HOSPADM

## 2023-11-15 RX ADMIN — OXYCODONE 5 MG: 5 TABLET ORAL at 15:37

## 2023-11-15 RX ADMIN — ACETAMINOPHEN 1000 MG: 500 TABLET, FILM COATED ORAL at 17:01

## 2023-11-15 RX ADMIN — SODIUM CHLORIDE, POTASSIUM CHLORIDE, SODIUM LACTATE AND CALCIUM CHLORIDE: 600; 310; 30; 20 INJECTION, SOLUTION INTRAVENOUS at 08:22

## 2023-11-15 RX ADMIN — NICOTINE POLACRILEX 2 MG: 2 GUM, CHEWING BUCCAL at 18:24

## 2023-11-15 RX ADMIN — ACETAMINOPHEN 1000 MG: 500 TABLET, FILM COATED ORAL at 23:03

## 2023-11-15 RX ADMIN — CEFAZOLIN 2 G: 1 INJECTION, POWDER, FOR SOLUTION INTRAMUSCULAR; INTRAVENOUS at 09:03

## 2023-11-15 RX ADMIN — SUGAMMADEX 200 MG: 100 INJECTION, SOLUTION INTRAVENOUS at 10:50

## 2023-11-15 RX ADMIN — CEFAZOLIN 2 G: 2 INJECTION, POWDER, FOR SOLUTION INTRAMUSCULAR; INTRAVENOUS at 17:06

## 2023-11-15 RX ADMIN — PROPOFOL 150 MG: 10 INJECTION, EMULSION INTRAVENOUS at 09:08

## 2023-11-15 RX ADMIN — CEFAZOLIN 2 G: 2 INJECTION, POWDER, FOR SOLUTION INTRAMUSCULAR; INTRAVENOUS at 22:17

## 2023-11-15 RX ADMIN — DEXAMETHASONE SODIUM PHOSPHATE 8 MG: 4 INJECTION INTRA-ARTICULAR; INTRALESIONAL; INTRAMUSCULAR; INTRAVENOUS; SOFT TISSUE at 09:12

## 2023-11-15 RX ADMIN — ACETAMINOPHEN 1000 MG: 500 TABLET, FILM COATED ORAL at 08:02

## 2023-11-15 RX ADMIN — HYDROMORPHONE HYDROCHLORIDE 0.4 MG: 2 INJECTION INTRAMUSCULAR; INTRAVENOUS; SUBCUTANEOUS at 09:50

## 2023-11-15 RX ADMIN — KETOROLAC TROMETHAMINE 15 MG: 30 INJECTION, SOLUTION INTRAMUSCULAR; INTRAVENOUS at 23:04

## 2023-11-15 RX ADMIN — FENTANYL CITRATE 100 MCG: 50 INJECTION, SOLUTION INTRAMUSCULAR; INTRAVENOUS at 09:06

## 2023-11-15 RX ADMIN — SODIUM CHLORIDE, POTASSIUM CHLORIDE, SODIUM LACTATE AND CALCIUM CHLORIDE: 600; 310; 30; 20 INJECTION, SOLUTION INTRAVENOUS at 15:42

## 2023-11-15 RX ADMIN — OXYCODONE HYDROCHLORIDE 10 MG: 10 TABLET, FILM COATED, EXTENDED RELEASE ORAL at 08:02

## 2023-11-15 RX ADMIN — KETOROLAC TROMETHAMINE 15 MG: 30 INJECTION, SOLUTION INTRAMUSCULAR; INTRAVENOUS at 17:00

## 2023-11-15 RX ADMIN — NICOTINE POLACRILEX 2 MG: 2 GUM, CHEWING BUCCAL at 15:37

## 2023-11-15 RX ADMIN — ONDANSETRON 4 MG: 2 INJECTION INTRAMUSCULAR; INTRAVENOUS at 10:16

## 2023-11-15 RX ADMIN — HYDROMORPHONE HYDROCHLORIDE 0.4 MG: 2 INJECTION INTRAMUSCULAR; INTRAVENOUS; SUBCUTANEOUS at 09:33

## 2023-11-15 RX ADMIN — MIDAZOLAM 2 MG: 1 INJECTION, SOLUTION INTRAMUSCULAR; INTRAVENOUS at 09:03

## 2023-11-15 RX ADMIN — ROCURONIUM BROMIDE 50 MG: 10 INJECTION, SOLUTION INTRAVENOUS at 09:08

## 2023-11-15 RX ADMIN — FENTANYL CITRATE 25 MCG: 50 INJECTION, SOLUTION INTRAMUSCULAR; INTRAVENOUS at 11:12

## 2023-11-15 RX ADMIN — SODIUM CHLORIDE, POTASSIUM CHLORIDE, SODIUM LACTATE AND CALCIUM CHLORIDE: 600; 310; 30; 20 INJECTION, SOLUTION INTRAVENOUS at 09:03

## 2023-11-15 RX ADMIN — FENTANYL CITRATE 25 MCG: 50 INJECTION, SOLUTION INTRAMUSCULAR; INTRAVENOUS at 12:00

## 2023-11-15 RX ADMIN — LIDOCAINE HYDROCHLORIDE 50 MG: 20 INJECTION, SOLUTION EPIDURAL; INFILTRATION; INTRACAUDAL at 09:06

## 2023-11-15 RX ADMIN — NICOTINE POLACRILEX 2 MG: 2 GUM, CHEWING BUCCAL at 20:24

## 2023-11-15 RX ADMIN — FENTANYL CITRATE 25 MCG: 50 INJECTION, SOLUTION INTRAMUSCULAR; INTRAVENOUS at 11:23

## 2023-11-15 ASSESSMENT — PAIN DESCRIPTION - PAIN TYPE
TYPE: SURGICAL PAIN

## 2023-11-15 ASSESSMENT — LIFESTYLE VARIABLES
TOTAL SCORE: 0
HAVE PEOPLE ANNOYED YOU BY CRITICIZING YOUR DRINKING: NO
ON A TYPICAL DAY WHEN YOU DRINK ALCOHOL HOW MANY DRINKS DO YOU HAVE: 0
EVER FELT BAD OR GUILTY ABOUT YOUR DRINKING: NO
TOTAL SCORE: 0
ALCOHOL_USE: NO
HAVE YOU EVER FELT YOU SHOULD CUT DOWN ON YOUR DRINKING: NO
DOES PATIENT WANT TO STOP DRINKING: NO
HOW MANY TIMES IN THE PAST YEAR HAVE YOU HAD 5 OR MORE DRINKS IN A DAY: 0
EVER HAD A DRINK FIRST THING IN THE MORNING TO STEADY YOUR NERVES TO GET RID OF A HANGOVER: NO
AVERAGE NUMBER OF DAYS PER WEEK YOU HAVE A DRINK CONTAINING ALCOHOL: 0
TOTAL SCORE: 0
CONSUMPTION TOTAL: NEGATIVE

## 2023-11-15 ASSESSMENT — COGNITIVE AND FUNCTIONAL STATUS - GENERAL
MOBILITY SCORE: 20
WALKING IN HOSPITAL ROOM: A LOT
SUGGESTED CMS G CODE MODIFIER MOBILITY: CJ
CLIMB 3 TO 5 STEPS WITH RAILING: A LOT
SUGGESTED CMS G CODE MODIFIER DAILY ACTIVITY: CH
DAILY ACTIVITIY SCORE: 24

## 2023-11-15 ASSESSMENT — PAIN SCALES - GENERAL: PAIN_LEVEL: 3

## 2023-11-15 ASSESSMENT — FIBROSIS 4 INDEX
FIB4 SCORE: 1.29
FIB4 SCORE: 1.29

## 2023-11-15 ASSESSMENT — PATIENT HEALTH QUESTIONNAIRE - PHQ9
2. FEELING DOWN, DEPRESSED, IRRITABLE, OR HOPELESS: NOT AT ALL
SUM OF ALL RESPONSES TO PHQ9 QUESTIONS 1 AND 2: 0
1. LITTLE INTEREST OR PLEASURE IN DOING THINGS: NOT AT ALL

## 2023-11-15 NOTE — ANESTHESIA POSTPROCEDURE EVALUATION
Patient: Debo Ortiz    Procedure Summary       Date: 11/15/23 Room / Location: [place] [id number] / SURGERY [facility]    Anesthesia Start: 0903 Anesthesia Stop: 1109    Procedure: WIDE RESECTION OF RIGHT THIGH SARCOMA, WOUND VAC (Right: Leg Upper) Diagnosis: (LEIOMYOSARCOMA OF THIGH)    Surgeons: Abdoulaye Sainz D.O. Responsible Provider: Yvon Castro M.D.    Anesthesia Type: general ASA Status: 3            Final Anesthesia Type: general  Last vitals  BP   Blood Pressure : (!) 142/84 (Noted)    Temp   36.1 °C (96.9 °F)    Pulse   (!) 58 (Noted)   Resp   12    SpO2   99 %      Anesthesia Post Evaluation    Patient location during evaluation: PACU  Patient participation: complete - patient participated  Level of consciousness: awake and alert  Pain score: 3    Airway patency: patent  Anesthetic complications: no  Cardiovascular status: hemodynamically stable  Respiratory status: acceptable  Hydration status: euvolemic    PONV: none          No notable events documented.     Nurse Pain Score: 3 (NPRS)

## 2023-11-15 NOTE — CARE PLAN
The patient is Stable - Low risk of patient condition declining or worsening    Shift Goals  Clinical Goals: Pain management, ambulate at least once before midnight  Patient Goals: Pain mangement    Progress made toward(s) clinical / shift goals:    Problem: Respiratory  Goal: Patient will achieve/maintain optimum respiratory ventilation and gas exchange  Outcome: Progressing  Patient weaned from 1L oxygen to 0.5L oxygen since arrival from PACU.  IS use encouraged, patient educated on the importance of deep breathing and coughing.     Problem: Mobility  Goal: Patient's capacity to carry out activities will improve  Outcome: Progressing   Patient educated on the importance of getting out of bed and ambulating the day of surgery.

## 2023-11-15 NOTE — ANESTHESIA PREPROCEDURE EVALUATION
Case: [id number] Date/Time: 11/15/23 0845    Procedures:       WIDE RESECTION OF RIGHT THIGH SARCOMA, WOUND VAC      APPLICATION OR REPLACEMENT, WOUND VAC    Pre-op diagnosis: LEIOMYOSARCOMA OF THIGH    Location: [place] OR 14 / SURGERY [facility]    Surgeons: DIEUDONNE Starkey H&P:  PAST MEDICAL HISTORY:   73 y.o. female who presents for Procedure(s):  WIDE RESECTION OF RIGHT THIGH SARCOMA, WOUND VAC  APPLICATION OR REPLACEMENT, WOUND VAC.  She has current and past medical problems significant for:    Patient denies history of seizures or strokes  Patient denies history of diabetes or thyroid disease  Patient denies history of Asthma or COPD  Patient denies history of GERD or esophageal disease  Patient denies history of CYNTHIA  Patient denies history of renal failure  Patient denies history of bleeding disorders    Patient denies history of previous MI, chest pain or shortness of breath  Able to climb 2 flights of stair without dyspnea or angina, > 4 METs     Patient denies history of complications with anesthesia    Past Medical History:   Diagnosis Date    Cancer (HCC)     Leiomyosarcoma    Cataract     IOL OU    Leiomyosarcoma (HCC)     Rosacea        SMOKING/ALCOHOL/RECREATIONAL DRUG USE:  Social History     Tobacco Use    Smoking status: Former     Current packs/day: 0.00     Types: Cigarettes     Quit date: 2023     Years since quittin.7    Smokeless tobacco: Never   Vaping Use    Vaping Use: Never used   Substance Use Topics    Alcohol use: Not Currently    Drug use: Never     Social History     Substance and Sexual Activity   Drug Use Never       PAST SURGICAL HISTORY:  Past Surgical History:   Procedure Laterality Date    TUMOR EXCISION WITH BIOPSY Right 2023    right thigh    ABDOMINAL SUBTOTAL HYSTERECTOMY      CATARACT EXTRACTION WITH IOL Bilateral     OTHER ORTHOPEDIC SURGERY      Bilateral Feet       ALLERGIES:   No Known Allergies    MEDICATIONS:  No current  facility-administered medications on file prior to encounter.     Current Outpatient Medications on File Prior to Encounter   Medication Sig Dispense Refill    nicotine polacrilex (NICORETTE) 2 MG Gum Take 2 mg by mouth see administration instructions. Up to 8 times per day as needed.      bisacodyl EC (DULCOLAX) 5 MG Tablet Delayed Response Take 5 mg by mouth 1 time a day as needed for Constipation (typically 1 x week).      alendronate (FOSAMAX) 70 MG Tab Take 70 mg by mouth every 7 days. MONDAYS      estradiol (VIVELLE DOT) 0.1 MG/24HR PATCH BIWEEKLY Place 1 Patch on the skin two times a week.      metronidazole (METROCREAM) 0.75 % cream Apply 1 Application topically every day.      Multiple Vitamins-Minerals (CENTRUM SILVER 50+WOMEN PO) Take 1 Tablet by mouth every day.      NON SPECIFIED Take 1 Tablet by mouth 1 time a day as needed (nerve pain). NERVIVE: RELIEFS NERVE ACHES, DISCOMFORT, AND WEAKNESS.  Ingredients: Alpha-Lipoic Acid, B Complex vitamins, tumeric, and ginger         LABS:  Recent Labs     11/13/23  1618   WBC 5.1   RBC 3.86*   HEMOGLOBIN 12.3   HEMATOCRIT 37.2   MCV 96.4   MCH 31.9   RDW 48.5   PLATELETCT 253   MPV 11.1   NEUTSPOLYS 71.00   LYMPHOCYTES 18.20*   MONOCYTES 8.60   EOSINOPHILS 1.20   BASOPHILS 0.80      Lab Results   Component Value Date/Time    SODIUM 135 11/13/2023 1618    POTASSIUM 4.1 11/13/2023 1618    CHLORIDE 101 11/13/2023 1618    CO2 24 11/13/2023 1618    GLUCOSE 83 11/13/2023 1618    BUN 15 11/13/2023 1618    CALCIUM 9.2 11/13/2023 1618         PREVIOUS ANESTHETICS: See EMR  __________________________________________    Relevant Problems   No relevant active problems       Physical Exam    Airway   Mallampati: III  TM distance: >3 FB  Neck ROM: full       Cardiovascular - normal exam  Rhythm: regular  Rate: normal  (-) murmur     Dental - normal exam           Pulmonary - normal exam  Breath sounds clear to auscultation     Abdominal   (+) obese     Neurological - normal  exam                   Anesthesia Plan    ASA 3 (cancer)       Plan - general       Airway plan will be ETT          Induction: intravenous    Postoperative Plan: Postoperative administration of opioids is intended.    Pertinent diagnostic labs and testing reviewed    Informed Consent:    Anesthetic plan and risks discussed with patient.    Use of blood products discussed with: patient whom consented to blood products.

## 2023-11-15 NOTE — ANESTHESIA TIME REPORT
Anesthesia Start and Stop Event Times       Date Time Event    11/15/2023 0851 Ready for Procedure     0903 Anesthesia Start     1109 Anesthesia Stop          Responsible Staff  11/15/23      Name Role Begin End    Yvon Castro M.D. Anesth 0903 1109          Overtime Reason:  no overtime (within assigned shift)    Comments:

## 2023-11-15 NOTE — PROGRESS NOTES
4 Eyes Skin Assessment Completed by SUBHA Willis and SUBHA Santos.    Head WDL  Ears WDL  Nose WDL  Mouth WDL  Neck WDL  Breast/Chest WDL  Shoulder Blades WDL  Spine WDL  (R) Arm/Elbow/Hand WDL  (L) Arm/Elbow/Hand WDL  Abdomen WDL  Groin WDL  Scrotum/Coccyx/Buttocks WDL  (R) Leg Incision  (L) Leg WDL  (R) Heel/Foot/Toe WDL  (L) Heel/Foot/Toe WDL          Devices In Places SCD's and Nasal Cannula      Interventions In Place Pressure Redistribution Mattress    Possible Skin Injury No    Pictures Uploaded Into Epic N/A  Wound Consult Placed N/A  RN Wound Prevention Protocol Ordered No

## 2023-11-15 NOTE — PROGRESS NOTES
Medication history reviewed with PT at bedside.    Med rec is complete per PT reporting.    Allergies reviewed.     Patient denies any outpatient antibiotics in the last 30 days.     Patient is not taking anticoagulants.    Preferred pharmacy for this visit - [facility] ([phone])

## 2023-11-15 NOTE — OR NURSING
1056  patient arrived from OR via heathredel pedrazaails up, brake locked. Oral airway in place. Received report from Dr. Castro and Faina LOVING RN, assumed care of patient. VSS. Right thigh dressing CDI. Two MARCIE drains in place. Right pedal pulse +2, foot warm, capillary refill less than 3. Patient appears comfortable, no s/s of pain or nausea noted. Orders reviewed and implemented.  1101 Rouses to voice, oral airway removed.  1105 Oriented x 4, denies pain or nausea. Tolerating sips of water. 1110 Reports 6/10 pain, Fentanyl administered.  1115  MD bedside to see patient and discuss procedure.  1155  Friend updated.  1210  Reports pain improved and tolerable, resting comfortably. VSS. Right leg dressing remains CDI. Recovery complete. Awaiting room on floor.  1300 Patient resting comfortably.  1400 VSS. Dressings remain CDI. 1410 Report to Lazaro MOYA RN.  1428 Discharged to floor with oxygen.

## 2023-11-15 NOTE — OR SURGEON
Immediate Post OP Note    PreOp Diagnosis: Right thigh Leiomyosarcoma, >10 cm      PostOp Diagnosis: Same      Procedure(s):  WIDE RESECTION OF RIGHT THIGH SARCOMA - Wound Class: Clean with Drain    Surgeon(s):  Abdoulaye Sainz D.O.    Anesthesiologist/Type of Anesthesia:  Anesthesiologist: Yvon Castro M.D./General    Surgical Staff:  Assistant: Sidra Phelan P.A.-C.  Circulator: Faina Watts R.N.  Relief Circulator: Joi Armendariz R.N.  Scrub Person: Ramsey May    Specimens removed if any:  ID Type Source Tests Collected by Time Destination   A : Right thigh sarcoma Other Other PATHOLOGY SPECIMEN Abdoulaye Sainz D.O. 11/15/2023 10:09 AM        Estimated Blood Loss: 200 cc    Findings: see op note    Complications: none        11/15/2023 11:07 AM Abdoulaye Sainz D.O.

## 2023-11-15 NOTE — PROGRESS NOTES
Patient arrived on unit from PACU. Transferred to bed via slide board. Patient is A&Ox4, rates her pain as tolerable. Dressing to right anterior thigh, 2 MARCIE drains in place. Patient oriented to room and call light system and updated on POC.

## 2023-11-15 NOTE — OP REPORT
Date of Operation: 11/15/2023    Preoperative Diagnosis: Leiomyosarcoma grade 3 right thigh, greater than 10 cm     Postoperative Diagnosis: Same    Indications: This is a 73-year-old female with history of a leiomyosarcoma of the right thigh.  She had previously undergone a nononcologic excision by an outside surgeon.  When the pathology returned as a sarcoma she was referred to me for further evaluation.  She was then sent for neoadjuvant radiation with plan for wide resection of the tumor bed and presents today for surgery.  A full PARQ was held with the patient preoperatively, she verbalized understanding and was in agreement with the plan to proceed.    Operative Procedure: Wide resection (radical) right thigh sarcoma, greater than 10 cm    Surgeon: Abdoulaye Sainz DO     Assistant:  Sidra Phelan PA-C  A surgical assistant in this surgery was indicated due to the complexity of the procedure.  Their role included aiding in the positioning, approach, retraction, holding of devices, manipulation of the limb and closure of wounds.    Anesthesia: General     Estimated Blood Loss:  200 ml    Specimens: Right thigh sarcoma-routine    Drains: Two 15 Croatian round drains     Implants: None    DESCRIPTION OF PROCEDURE:     The patient was transferred to the operating table and placed in the supine position. Pressure points were padded and the patient was secured to the table. Sequential compression garments were applied to the lower extremities. General anesthesia was induced and the patient was intubated by the anesthesia team.  Ancef was given as prophylaxis. The right lower extremity was prepped and draped in standard fashion. A time out was performed.     An elliptical incision was marked out to excise the patient's prior scar and tumor bed.  I measured 2 cm in every direction from the patient's scar to noa out the incision.  The incision was then made and electrocautery was used for hemostasis.   Dissection was carried through skin and subcutaneous tissue out in a radial fashion resecting a wider area as the dissection proceeded deeper.  The dissection proceeded down to muscular fascia which was to be used as the deep margin.  I circumferentially came around using guided dissection to open the fascia on the deep margin.  Once the fascia had been completely incised circumferentially I began  from the deep side of the fascia using guided dissection.  A minimal amount of muscle was taken with the fascia.  Once the tumor was completely free from the limb it was marked for orientation of the back table and sent to pathology as a routine specimen.  Several small bleeding veins were ligated with 2-0 silk.  The wound was then copiously irrigated with multiple liters of saline.  1.5 g of cefuroxime powder was placed deep within the wound.  Two 15 Kazakh round drains were placed deep within the wound.     The wound was closed in layers using 0 Vicryl, 2-0 Vicryl and 3-0 nylon. The drains were secured with 2-0 nylon.. Sterile dressings were applied using bacitracin, gauze and tape.     The patient was then awakened, extubated and transferred to the post-anesthesia care unit in stable condition.     Disposition: After recovery in the post anesthesia care unit the patient will transfer to the [facility] floor for convalescent care. Weight bearing restrictions: None. They will receive 24 hours antibiotic prophylaxis with Ancef. DVT prophylaxis will be initiated post operative day 1 with aspirin 325 mg twice daily. They will advance to a regular diet. They will work with PT/OT.  She will follow-up in 4 weeks for a wound check and likely suture removal.  Drains will remain in place until less than 30 cc of output in 24 hours.    Abdoulaye Sainz DO  Orthopedic Oncologist

## 2023-11-15 NOTE — ANESTHESIA PROCEDURE NOTES
Airway    Date/Time: 11/15/2023 9:11 AM    Performed by: Yvon Castro M.D.  Authorized by: Yvon Castro M.D.    Location:  OR  Urgency:  Elective  Indications for Airway Management:  Anesthesia      Spontaneous Ventilation: absent    Sedation Level:  Deep  Preoxygenated: Yes    Patient Position:  Sniffing  Mask Difficulty Assessment:  2 - vent by mask + OA or adjuvant +/- NMBA  Final Airway Type:  Endotracheal airway  Final Endotracheal Airway:  ETT  Cuffed: Yes    Technique Used for Successful ETT Placement:  Video laryngoscopy    Insertion Site:  Oral  Blade Type:  Glide  Laryngoscope Blade/Videolaryngoscope Blade Size:  3  ETT Size (mm):  6.5  Measured from:  Lips  ETT to Lips (cm):  21  Placement Verified by: auscultation and capnometry    Cormack-Lehane Classification:  Grade I - full view of glottis  Number of Attempts at Approach:  1   Atraumatic x1 attempt

## 2023-11-16 ENCOUNTER — PHARMACY VISIT (OUTPATIENT)
Dept: PHARMACY | Facility: MEDICAL CENTER | Age: 73
End: 2023-11-16
Payer: COMMERCIAL

## 2023-11-16 VITALS
OXYGEN SATURATION: 95 % | SYSTOLIC BLOOD PRESSURE: 130 MMHG | TEMPERATURE: 97.3 F | DIASTOLIC BLOOD PRESSURE: 63 MMHG | HEIGHT: 66 IN | HEART RATE: 63 BPM | BODY MASS INDEX: 30.68 KG/M2 | RESPIRATION RATE: 17 BRPM | WEIGHT: 190.92 LBS

## 2023-11-16 PROBLEM — C49.9 LEIOMYOSARCOMA (HCC): Chronic | Status: RESOLVED | Noted: 2023-08-28 | Resolved: 2023-11-16

## 2023-11-16 LAB
ANION GAP SERPL CALC-SCNC: 7 MMOL/L (ref 7–16)
BUN SERPL-MCNC: 12 MG/DL (ref 8–22)
CALCIUM SERPL-MCNC: 8.9 MG/DL (ref 8.5–10.5)
CHLORIDE SERPL-SCNC: 102 MMOL/L (ref 96–112)
CO2 SERPL-SCNC: 24 MMOL/L (ref 20–33)
CREAT SERPL-MCNC: 0.63 MG/DL (ref 0.5–1.4)
ERYTHROCYTE [DISTWIDTH] IN BLOOD BY AUTOMATED COUNT: 48.9 FL (ref 35.9–50)
GFR SERPLBLD CREATININE-BSD FMLA CKD-EPI: 94 ML/MIN/1.73 M 2
GLUCOSE SERPL-MCNC: 105 MG/DL (ref 65–99)
HCT VFR BLD AUTO: 36.3 % (ref 37–47)
HGB BLD-MCNC: 12 G/DL (ref 12–16)
MCH RBC QN AUTO: 31.9 PG (ref 27–33)
MCHC RBC AUTO-ENTMCNC: 33.1 G/DL (ref 32.2–35.5)
MCV RBC AUTO: 96.5 FL (ref 81.4–97.8)
PLATELET # BLD AUTO: 245 K/UL (ref 164–446)
PMV BLD AUTO: 10.9 FL (ref 9–12.9)
POTASSIUM SERPL-SCNC: 4.5 MMOL/L (ref 3.6–5.5)
RBC # BLD AUTO: 3.76 M/UL (ref 4.2–5.4)
SODIUM SERPL-SCNC: 133 MMOL/L (ref 135–145)
WBC # BLD AUTO: 8.3 K/UL (ref 4.8–10.8)

## 2023-11-16 PROCEDURE — 97535 SELF CARE MNGMENT TRAINING: CPT

## 2023-11-16 PROCEDURE — 97166 OT EVAL MOD COMPLEX 45 MIN: CPT

## 2023-11-16 PROCEDURE — 700111 HCHG RX REV CODE 636 W/ 250 OVERRIDE (IP): Performed by: ORTHOPAEDIC SURGERY

## 2023-11-16 PROCEDURE — 700102 HCHG RX REV CODE 250 W/ 637 OVERRIDE(OP): Performed by: SPECIALIST

## 2023-11-16 PROCEDURE — 80048 BASIC METABOLIC PNL TOTAL CA: CPT

## 2023-11-16 PROCEDURE — 85027 COMPLETE CBC AUTOMATED: CPT

## 2023-11-16 PROCEDURE — 700102 HCHG RX REV CODE 250 W/ 637 OVERRIDE(OP): Performed by: ORTHOPAEDIC SURGERY

## 2023-11-16 PROCEDURE — 97161 PT EVAL LOW COMPLEX 20 MIN: CPT

## 2023-11-16 PROCEDURE — A9270 NON-COVERED ITEM OR SERVICE: HCPCS | Performed by: SPECIALIST

## 2023-11-16 PROCEDURE — A9270 NON-COVERED ITEM OR SERVICE: HCPCS | Performed by: ORTHOPAEDIC SURGERY

## 2023-11-16 RX ORDER — ASPIRIN 325 MG
325 TABLET ORAL 2 TIMES DAILY
Qty: 56 TABLET | Refills: 0 | Status: SHIPPED | OUTPATIENT
Start: 2023-11-16 | End: 2023-12-14

## 2023-11-16 RX ORDER — OXYCODONE HYDROCHLORIDE 5 MG/1
5 TABLET ORAL EVERY 4 HOURS PRN
Qty: 20 TABLET | Refills: 0 | Status: SHIPPED | OUTPATIENT
Start: 2023-11-16 | End: 2023-11-21

## 2023-11-16 RX ADMIN — KETOROLAC TROMETHAMINE 15 MG: 30 INJECTION, SOLUTION INTRAMUSCULAR; INTRAVENOUS at 05:21

## 2023-11-16 RX ADMIN — ACETAMINOPHEN 1000 MG: 500 TABLET, FILM COATED ORAL at 05:20

## 2023-11-16 RX ADMIN — NICOTINE POLACRILEX 2 MG: 2 GUM, CHEWING BUCCAL at 07:24

## 2023-11-16 RX ADMIN — ASPIRIN 325 MG: 325 TABLET ORAL at 05:19

## 2023-11-16 ASSESSMENT — COGNITIVE AND FUNCTIONAL STATUS - GENERAL
MOBILITY SCORE: 22
DAILY ACTIVITIY SCORE: 24
SUGGESTED CMS G CODE MODIFIER MOBILITY: CJ
CLIMB 3 TO 5 STEPS WITH RAILING: A LITTLE
WALKING IN HOSPITAL ROOM: A LITTLE
SUGGESTED CMS G CODE MODIFIER DAILY ACTIVITY: CH

## 2023-11-16 ASSESSMENT — GAIT ASSESSMENTS
DISTANCE (FEET): 30
DEVIATION: ANTALGIC;DECREASED HEEL STRIKE
GAIT LEVEL OF ASSIST: SUPERVISED

## 2023-11-16 ASSESSMENT — ACTIVITIES OF DAILY LIVING (ADL): TOILETING: INDEPENDENT

## 2023-11-16 NOTE — PROGRESS NOTES
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  DOS: 11/16/23    Subjective: Doing well and would like to go home    Activity out of bed: yes  Bowel movement since surgery: No passing gas    Objective:  /57   Pulse (!) 55   Temp 36.1 °C (97 °F) (Temporal)   Resp 17   Ht 1.676 m (5' 6\")   Wt 86.6 kg (190 lb 14.7 oz)   SpO2 96%   BMI 30.81 kg/m²     Gen - NAD, resting comfortably  CV - Extremities pink and well perfused,  pulses 2+  Resp - breathing non labored, symmetric chest rise  Abd - flat non-distended  MSK - Right thigh with dressing, warm foot, MARCIE drains with minimal serosang output    Labs:  Recent Labs     11/13/23  1618 11/16/23  0527   WBC 5.1 8.3   RBC 3.86* 3.76*   HEMOGLOBIN 12.3 12.0   HEMATOCRIT 37.2 36.3*   MCV 96.4 96.5   MCH 31.9 31.9   RDW 48.5 48.9   PLATELETCT 253 245   MPV 11.1 10.9   NEUTSPOLYS 71.00  --    LYMPHOCYTES 18.20*  --    MONOCYTES 8.60  --    EOSINOPHILS 1.20  --    BASOPHILS 0.80  --      MARCIE drain output: 120 cc outpt total    Assessment/Plan:  Debo Ortiz is a 73 y.o. female that is POD 1 from a wide resection of right thigh sarcoma. She is recovering well. She has been using a walker. Pain is controlled with oral medication. She has watched the nurses empty her drain.    -chronic home medications have been restarted  -diet:  Orders Placed This Encounter   Procedures    Diet Order Diet: Regular     Standing Status:   Standing     Number of Occurrences:   1     Order Specific Question:   Diet:     Answer:   Regular [1]      -anticoagulation: Aspirin  -antibiotics: Ancef 2 doses completed  -restrictions: None  -PT/OT Pending  -labs: Noted  -anticipated discharge today      Addendum:  Debo is doing well.  Her pain is well controlled.  She is requesting to discharge home today.  She has been up and ambulated with her nurses.    Dressing is clean/dry and intact.  DP and PT pulses are 2+.  Sensation intact light touch SPN, DPN, plantar and sural nerves.  " Motor intact to ankle plantarflexion/dorsiflexion, inversion and eversion, knee flexion and extension, hip flexion.  She ambulates with mild antalgic gait.  Drains with serosanguineous drainage.    She will discharge home today with prescriptions for pain medicine and aspirin for DVT prophylaxis.  She will follow-up in 4 weeks for wound check.  She was given instructions on drain care and signs/symptoms to call or return to the hospital.    Abdoulaye Sainz,   Orthopedic Oncology and Orthopedics   [facility]

## 2023-11-16 NOTE — THERAPY
Occupational Therapy   Initial Evaluation     Patient Name: Debo Ortiz  Age:  73 y.o., Sex:  female  Medical Record #: [id number]  Today's Date: 11/16/2023    Precautions: Weight Bearing As Tolerated Right Lower Extremity    Assessment  Patient is 73 y.o. female seen s/p right thigh sarcoma resection for OT evaluation. Pt able to demonstrate ADLs and mobility supervision level including full body  dressing, toileting, and standing grooming without AD. Provided education on compensatory strategies for showering, dressing, toileting, and IADLs for comfort and safety. Pt has assist from friend for the first few days upon DC. No further OT needs indicated.    Plan    Occupational Therapy Initial Treatment Plan   Duration: (P) Evaluation only    DC Equipment Recommendations: (P) None  Discharge Recommendations: (P) Anticipate that the patient will have no further occupational therapy needs after discharge from the hospital      11/16/23 0901   Prior Living Situation   Prior Services Home-Independent   Housing / Facility 1 Story House   Bathroom Set up Walk In Shower   Equipment Owned None   Lives with - Patient's Self Care Capacity Alone and Able to Care For Self   Comments Reports a friend will be staying with her for first few days   Prior Level of ADL Function   Self Feeding Independent   Grooming / Hygiene Independent   Bathing Independent   Dressing Independent   Toileting Independent   Prior Level of IADL Function   Medication Management Independent   Laundry Independent   Kitchen Mobility Independent   Finances Independent   Home Management Independent   Shopping Independent   Prior Level Of Mobility Independent Without Device in Community   Driving / Transportation Driving Independent   Occupation (Pre-Hospital Vocational) Employed Full Time   Precautions   Precautions Weight Bearing As Tolerated Right Lower Extremity   Pain   Intervention Declines   Cognition    Cognition / Consciousness WDL   Comments  agreeable, receptive   Active ROM Upper Body   Active ROM Upper Body  WDL   Strength Upper Body   Upper Body Strength  WDL   Balance Assessment   Sitting Balance (Static) Good   Sitting Balance (Dynamic) Good   Standing Balance (Static) Good   Standing Balance (Dynamic) Good   Weight Shift Sitting Good   Weight Shift Standing Good   Comments no AD   Bed Mobility    Supine to Sit Supervised   Scooting Supervised   Rolling Supervised   ADL Assessment   Grooming Supervision;Standing   Upper Body Dressing Supervision   Lower Body Dressing Supervision   Toileting Supervision   Comments full body dressing with cues for comfort   How much help from another person does the patient currently need...   6 Clicks Daily Activity Score 24   Functional Mobility   Sit to Stand Supervised   Bed, Chair, Wheelchair Transfer Supervised   Toilet Transfers Supervised   Patient / Family Goals   Patient / Family Goal #1 to go home   Education Group   Education Provided Activities of Daily Living   Role of Occupational Therapist Patient Response Patient;Acceptance;Explanation   ADL Patient Response Patient;Acceptance;Explanation   Additional Comments discussed showering safety   Occupational Therapy Initial Treatment Plan    Duration Evaluation only   Anticipated Discharge Equipment and Recommendations   DC Equipment Recommendations None   Discharge Recommendations Anticipate that the patient will have no further occupational therapy needs after discharge from the hospital

## 2023-11-16 NOTE — DISCHARGE SUMMARY
DISCHARGE  SUMMARY    DATE OF ADMISSION: 11/15/2023    DATE OF DISCHARGE: 11/16/2023    DISCHARGE DIAGNOSIS:  Right thigh sarcoma s/p wide resection    PROCEDURES:  Wide resection (radical) right thigh sarcoma, greater than 10 cm     PATHOLOGY:  Pending    HOSPITAL COURSE:  She was admitted after undergoing the above procedure on 11/15/23. She started ambulation right after surgery. She was able to ambulate without any issues.  She learned how to manage her drains. Her pain was controlled with oral medication. She was able to tolerate solid food, pass gas, and urinate and patient was discharged home.     DISPOSITION:   Discharged home on 11/16/2023. The patient was counseled and questions were answered. Specifically, signs and symptoms of infection, respiratory decompensation, and persistent or worsening pain were discussed and the patient agrees to seek medical attention if any of these develop.    DISCHARGE MEDICATIONS:  The patients controlled substance history was reviewed and a controlled substance use informed consent (if applicable) was provided by [facility] and the patient has been prescribed.     Medication List        START taking these medications        Instructions   aspirin 325 MG Tabs  Commonly known as: Asa   Take 1 Tablet by mouth 2 times a day for 28 days.  Dose: 325 mg     oxyCODONE immediate-release 5 MG Tabs  Commonly known as: Roxicodone   Take 1 Tablet by mouth every four hours as needed for Severe Pain for up to 5 days.  Dose: 5 mg            CONTINUE taking these medications        Instructions   alendronate 70 MG Tabs  Commonly known as: Fosamax   Take 70 mg by mouth every 7 days. MONDAYS  Dose: 70 mg     BENEFIBER PO   Take 1 Capsule by mouth 2 times a day as needed.  Dose: 1 Capsule     bisacodyl EC 5 MG Tbec   Take 5 mg by mouth 1 time a day as needed for Constipation (typically 1 x week).  Dose: 5 mg     CENTRUM SILVER 50+WOMEN PO   Take 1 Tablet by mouth  every day.  Dose: 1 Tablet     estradiol 0.1 MG/24HR Pttw  Commonly known as: Irma Dot   Place 1 Patch on the skin two times a week.  Dose: 1 Patch     metronidazole 0.75 % cream  Commonly known as: Metrocream   Apply 1 Application topically every day.  Dose: 1 Application     nicotine polacrilex 2 MG Gum  Commonly known as: Nicorette   Take 2 mg by mouth see administration instructions. Up to 8 times per day as needed.  Dose: 2 mg     NON SPECIFIED   Take 1 Tablet by mouth 1 time a day as needed (nerve pain). NERVIVE: RELIEFS NERVE ACHES, DISCOMFORT, AND WEAKNESS.  Ingredients: Alpha-Lipoic Acid, B Complex vitamins, tumeric, and ginger  Dose: 1 Tablet     PREVAGEN PO   Take 1 Tablet by mouth every day.  Dose: 1 Tablet     SSD 1 % Crea  Generic drug: silver sulfADIAZINE   APPLY TOPICALLY TO RIGHT GROIN THREE TIMES DAILY            You will be given a prescription for pain medication at discharge. Please take these as directed. It is important to remember not to take medications on an empty stomach as this may cause nausea.  You may also take over the counter acetaminophen and/or NSAIDS (ibuprofen, Aleve, Advil, Motrin) per the package instructions.  You may also use ice to the wound to decrease pain and swelling. You may alternate 20 minutes on and 20 minutes off with the ice for the first 24-48 hours. Make sure you place a washcloth or towel between the ice pack and your skin.  Please note that narcotic pain medication cannot be refilled unless you are seen by a doctor. Make sure you call the office if you are running low on medication or if the dose you have been prescribed is not working well for you.    ACTIVITY RESTRICTIONS:      WOUND CARE:  You may shower, but do not submerge in a bath until you are told you can by your surgeon. If you have wound dressings, they may come off after 48 hours. If you have skin glue to the wound, this will fall off on its own, do not pick at it. If you have steri strips to the  wound, these will fall off on their own, do not pick at them, you may trim the edges if needed.    DIET:  Upon discharge from the hospital, you may resume your normal preoperative diet, unless specifically directed otherwise. Depending on how you are feeling and whether you have nausea or not, you may wish to stay with a bland diet for the first few days. However, you can advance this as quickly as you feel ready.    FOLLOW UP:  Abdoulaye Sainz D.O.  [address] [place] [address]  [facility] [address]  [phone]    Go on 12/13/2023      Call the office if you have: (1) Fevers to more than 101F, (2) Unusual chest or leg pain, (3) Drainage or fluid from incision that may be foul smelling, increased tenderness or soreness at the wound or the wound edges are no longer together, redness or swelling at the incision site. Do not hesitate to call with any other questions.    Principal Problem (Resolved):    Leiomyosarcoma (HCC) (Chronic) (POA: Yes)  Active Problems:    Cigarette nicotine dependence without complication (POA: Unknown)    Postmenopausal HRT (hormone replacement therapy) (POA: Unknown)       TIME SPENT ON DISCHARGE: 25 minutes      ____________________________________________  Sidra Phelan P.A.-C.    DD: 11/16/2023 7:44 AM

## 2023-11-16 NOTE — THERAPY
Physical Therapy   Initial Evaluation     Patient Name: Debo Ortiz  Age:  73 y.o., Sex:  female  Medical Record #: [id number]  Today's Date: 11/16/2023     Precautions: Weight Bearing As Tolerated Right Lower Extremity    Assessment  Patient is 73 y.o. female POD #1 R thigh sarcoma resection. Pt up in chair pre/post session. Pt able to negotiate fxnl mob with SPV, no AD required. She demonstrates antalgic gait pattern. Pt instructed in R hip ROM exercises to maintain tissue flexibility post-op. She is preparing to gather her things for DC. No further mobility concerns at this time.    Plan    Physical Therapy Initial Treatment Plan   Duration: Evaluation only     Objective    Prior Living Situation   Prior Services None   Housing / Facility 1 Story Apartment / Condo   Steps Into Home 0   Equipment Owned None   Lives with - Patient's Self Care Capacity Alone and Able to Care For Self   Prior Level of Functional Mobility   Bed Mobility Independent   Transfer Status Independent   Ambulation Independent   Ambulation Distance Community distances   Assistive Devices Used None   History of Falls   History of Falls No   Cognition    Cognition / Consciousness WDL   Level of Consciousness Alert   Comments pleasant and cooperative   Active ROM Upper Body   Active ROM Upper Body  WDL   Active ROM Lower Body    Active ROM Lower Body  WDL   Strength Lower Body   Lower Body Strength  X   Comments pain with R hip flexion, fair ROM for fxnl tasks   Sensation Lower Body   Lower Extremity Sensation   WDL   Balance Assessment   Sitting Balance (Static) Fair +   Sitting Balance (Dynamic) Fair +   Standing Balance (Static) Fair   Standing Balance (Dynamic) Fair   Weight Shift Sitting Fair   Weight Shift Standing Fair   Comments no AD, no LOB   Bed Mobility    Comments NT - up in chair pre/post   Gait Analysis   Gait Level Of Assist Supervised   Assistive Device None   Distance (Feet) 30   # of Times Distance was Traveled 1    Deviation Antalgic;Decreased Heel Strike   # of Stairs Climbed 0   Weight Bearing Status WBAT RLE   Functional Mobility   Sit to Stand Supervised   Bed, Chair, Wheelchair Transfer Supervised   Transfer Method Stand Step   How much difficulty does the patient currently have...   Turning over in bed (including adjusting bedclothes, sheets and blankets)? 4   Sitting down on and standing up from a chair with arms (e.g., wheelchair, bedside commode, etc.) 4   Moving from lying on back to sitting on the side of the bed? 4   How much help from another person does the patient currently need...   Moving to and from a bed to a chair (including a wheelchair)? 4   Need to walk in a hospital room? 3   Climbing 3-5 steps with a railing? 3   6 clicks Mobility Score 22   Activity Tolerance   Sitting in Chair pre/post   Sitting Edge of Bed NT   Standing 5 mins

## 2023-11-16 NOTE — DISCHARGE PLANNING
.Care Transition Team Assessment    Information Source  Orientation Level: Oriented X4  Information Given By: Patient  Informant's Name: Haley [address] [place] [id number]  Who is responsible for making decisions for patient? : Patient    Readmission Evaluation  Is this a readmission?: No    Elopement Risk  Legal Hold: No  Ambulatory or Self Mobile in Wheelchair: Yes  Disoriented: No  Psychiatric Symptoms: None  History of Wandering: No  Elopement this Admit: No  Vocalizing Wanting to Leave: No  Displays Behaviors, Body Language Wanting to Leave: No-Not at Risk for Elopement  Elopement Risk: Not at Risk for Elopement    Interdisciplinary Discharge Planning  Does Admitting Nurse Feel This Could be a Complex Discharge?: No  Primary Care Physician: SARAH RIVERA-*  Lives with - Patient's Self Care Capacity: Alone and Able to Care For Self  Patient or legal guardian wants to designate a caregiver: No  Support Systems: Family Member(s)  Housing / Facility: 1 Story Apartment / Condo  Do You Take your Prescribed Medications Regularly: Yes (use pharmaceutical supplies, patches and cream.)  Able to Return to Previous ADL's: Yes  Mobility Issues: No  Prior Services: None  Patient Prefers to be Discharged to:: home  Durable Medical Equipment: Not Applicable    Discharge Preparedness  What is your plan after discharge?: Home with help  Prior Functional Level: Ambulatory, Independent with Activities of Daily Living    Functional Assesment  Prior Functional Level: Ambulatory, Independent with Activities of Daily Living    Finances  Financial Barriers to Discharge: No  Prescription Coverage: Yes ([facility] [address]    Vision / Hearing Impairment  Vision Impairment : Yes  Right Eye Vision: Impaired, Wears Glasses  Left Eye Vision: Impaired, Wears Glasses  Hearing Impairment : No         Advance Directive  Advance Directive?: Living Will (Per the patient her niece Edith Culver [phone] will serve as her  Advanced Directive.)    Domestic Abuse  Have you ever been the victim of abuse or violence?: No  Physical Abuse or Sexual Abuse: No  Verbal Abuse or Emotional Abuse: No  Possible Abuse/Neglect Reported to:: Not Applicable    Psychological Assessment  History of Substance Abuse: None  History of Psychiatric Problems: No    Discharge Risks or Barriers  Discharge risks or barriers?: No    Anticipated Discharge Information  Discharge Disposition: Discharged to home/self care (01)  Discharge Address: [address] [place] [address] [address] [place] [address]  Discharge Contact Phone Number: [phone]    CM met with the patient at the bedside to discuss discharge planning.  Per the patient a friend will transport her home on discharge.   Patient is medically cleared with no  case management discharge needs. .

## 2023-11-16 NOTE — PROGRESS NOTES
DC to dic lounge with 2 MARCIE drains in place as ordered. Pt instructed in how to drain and record output every 8 hours. She was also instructed to thoroughly wash her hands before and after she drains each drain to prevent infection.  PT and OT have seen the patient and feel she has been cleared.

## 2023-11-16 NOTE — CARE PLAN
The patient is Stable - Low risk of patient condition declining or worsening    Shift Goals  Clinical Goals: Pain management, ambulate at least once before midnight  Patient Goals: Pain mangement    Progress made toward(s) clinical / shift goals:    Problem: Pain - Standard  Goal: Alleviation of pain or a reduction in pain to the patient’s comfort goal  Outcome: Progressing     Administer pain medication as order; provide non pharmacologic intervention  Problem: Knowledge Deficit - Standard  Goal: Patient and family/care givers will demonstrate understanding of plan of care, disease process/condition, diagnostic tests and medications  Outcome: Progressing   Update whiteboard; questions answered    Patient is not progressing towards the following goals:

## 2023-11-16 NOTE — DISCHARGE INSTRUCTIONS
Discharge Instructions:    Diet: Resume your regular diet. Try to eat protein with every meal, this is important for healing and recovery. Drink plenty of water to stay hydrated and avoid constipation after surgery.    Restrictions: Weightbearing as tolerated on the operative extremity. Elevate above heart level to help with pain and swelling.    Drain: Record output in drains daily. Once there is less than 30 ml in 24 hours the drain can be removed. Please call and we can talk you through how to do this.     Pain control: You may take tylenol 1000 mg three times daily for baseline pain control or for mild pain. Do not take more then two doses daily if you have any liver disease or impairment., You may take ibuprofen 600 mg three times daily for pain control and inflammation. You can take a different anti-inflammatory equivalent to ibuprofen if you prefer. Take with a full glass of water and food. , and Oxycodone is a narcotic pain medicine. You may take 5 mg every 4 hours as needed for severe pain. Do not drive or operate machinery when taking this medicine. This can cause constipation so take a stool softener when taking this. Start to wean by spacing out the time interval between doses and lowering the dose as able.     Blood Clot Prevention: Take aspirin 325 mg morning and night for 4 weeks after surgery.    Wound care: Keep wound clean and dry. You may shower over the wound and pat dry starting post operative day four. Replace dressings to keep wound covered unless you are in a clean environment then the wound can remain open to air. Do not let pets sit on or lick your wound. Call if you develop redness spreading from the wound, puss draining from the wound or new drainage from the wound after it has been dry.     Call if you develop fevers, chills, wound concerns or have any questions.

## 2023-11-20 ENCOUNTER — TELEPHONE (OUTPATIENT)
Dept: SURGICAL ONCOLOGY | Facility: MEDICAL CENTER | Age: 73
End: 2023-11-20
Payer: MEDICARE

## 2023-11-20 NOTE — TELEPHONE ENCOUNTER
Called patient to review pathology report. She reports one of the drains has had zero output over the last 24-48 hours. The other one is still putting out. I instructed her on the removal of the drain that is not producing. She understood. All questions were answered.     Abdoulaye Sainz, DO  Orthopedic Oncology and Orthopedics   [facility]

## 2023-12-14 ENCOUNTER — OFFICE VISIT (OUTPATIENT)
Dept: SURGICAL ONCOLOGY | Facility: MEDICAL CENTER | Age: 73
End: 2023-12-14
Payer: MEDICARE

## 2023-12-14 ENCOUNTER — TELEPHONE (OUTPATIENT)
Dept: SURGICAL ONCOLOGY | Facility: MEDICAL CENTER | Age: 73
End: 2023-12-14

## 2023-12-14 VITALS
TEMPERATURE: 97.8 F | BODY MASS INDEX: 29.73 KG/M2 | SYSTOLIC BLOOD PRESSURE: 102 MMHG | HEIGHT: 66 IN | HEART RATE: 78 BPM | WEIGHT: 185 LBS | OXYGEN SATURATION: 97 % | DIASTOLIC BLOOD PRESSURE: 60 MMHG

## 2023-12-14 DIAGNOSIS — C49.21: Primary | ICD-10-CM

## 2023-12-14 DIAGNOSIS — C49.9 MALIGNANT NEOPLASM OF CONNECTIVE AND SOFT TISSUE (HCC): ICD-10-CM

## 2023-12-14 PROCEDURE — 3078F DIAST BP <80 MM HG: CPT | Performed by: ORTHOPAEDIC SURGERY

## 2023-12-14 PROCEDURE — 99024 POSTOP FOLLOW-UP VISIT: CPT | Performed by: ORTHOPAEDIC SURGERY

## 2023-12-14 PROCEDURE — 3074F SYST BP LT 130 MM HG: CPT | Performed by: ORTHOPAEDIC SURGERY

## 2023-12-14 ASSESSMENT — ENCOUNTER SYMPTOMS
CHILLS: 0
SHORTNESS OF BREATH: 0
FEVER: 0
WEAKNESS: 0
SENSORY CHANGE: 1

## 2023-12-14 ASSESSMENT — FIBROSIS 4 INDEX: FIB4 SCORE: 1.33

## 2023-12-14 NOTE — PROGRESS NOTES
Diagnosis:  Right thigh leiomyosarcoma    Surgical Interventions:   Non oncologic resection outside surgeon 8/4/23  Wide resection right thigh tumor bed 11/15/23    Radiation:  Neoadjuvant 50 Gy    Chemotherapy:  NA    HPI: Patient is a pleasant 73 yof here for post op follow up after wide resection of a right thigh tumor bed for non oncologic resection of a high grade leiomyosarcoma. She reports she is doing well without CP, SOB, fevers, chills or wound issues. She has numbness on the inner thigh and leg. Denies any significant pain. Drain is putting out ~60 cc daily.     Past Medical History:   Past Medical History:   Diagnosis Date    Cancer (HCC)     Leiomyosarcoma    Cataract     IOL OU    Leiomyosarcoma (HCC)     Rosacea        Past Surgical History:  Past Surgical History:   Procedure Laterality Date    WIDE EXCISION, LESION, LOWER EXTREMITY Right 11/15/2023    Procedure: WIDE RESECTION OF RIGHT THIGH SARCOMA, WOUND VAC;  Surgeon: Abdoulaye Sainz D.O.;  Location: SURGERY [facility];  Service: Orthopedics    TUMOR EXCISION WITH BIOPSY Right 08/04/2023    right thigh    ABDOMINAL SUBTOTAL HYSTERECTOMY      CATARACT EXTRACTION WITH IOL Bilateral     OTHER ORTHOPEDIC SURGERY      Bilateral Feet       Outpatient Encounter Medications as of 12/14/2023   Medication Sig Dispense Refill    aspirin (ASA) 325 MG Tab Take 1 Tablet by mouth 2 times a day for 28 days. 56 Tablet 0    nicotine polacrilex (NICORETTE) 2 MG Gum Take 2 mg by mouth see administration instructions. Up to 8 times per day as needed.      bisacodyl EC (DULCOLAX) 5 MG Tablet Delayed Response Take 5 mg by mouth 1 time a day as needed for Constipation (typically 1 x week).      SSD 1 % Cream APPLY TOPICALLY TO RIGHT GROIN THREE TIMES DAILY      Apoaequorin (PREVAGEN PO) Take 1 Tablet by mouth every day.      Wheat Dextrin (BENEFIBER PO) Take 1 Capsule by mouth 2 times a day as needed.      alendronate (FOSAMAX) 70 MG Tab Take 70 mg by mouth  every 7 days.       estradiol (VIVELLE DOT) 0.1 MG/24HR PATCH BIWEEKLY Place 1 Patch on the skin two times a week.      metronidazole (METROCREAM) 0.75 % cream Apply 1 Application topically every day.      Multiple Vitamins-Minerals (CENTRUM SILVER 50+WOMEN PO) Take 1 Tablet by mouth every day.      NON SPECIFIED Take 1 Tablet by mouth 1 time a day as needed (nerve pain). NERVIVE: RELIEFS NERVE ACHES, DISCOMFORT, AND WEAKNESS.  Ingredients: Alpha-Lipoic Acid, B Complex vitamins, tumeric, and ginger       No facility-administered encounter medications on file as of 2023.        Allergies:   No Known Allergies    Family History:   Family History   Problem Relation Age of Onset    Lung Cancer Sister        Social History:   Social History     Tobacco Use   Smoking Status Former    Current packs/day: 0.00    Types: Cigarettes    Quit date: 2023    Years since quittin.8   Smokeless Tobacco Never     Social History     Substance and Sexual Activity   Alcohol Use Not Currently     Social History     Substance and Sexual Activity   Drug Use Never     Social History     Socioeconomic History    Marital status:      Spouse name: Not on file    Number of children: Not on file    Years of education: Not on file    Highest education level: Not on file   Occupational History    Not on file   Tobacco Use    Smoking status: Former     Current packs/day: 0.00     Types: Cigarettes     Quit date: 2023     Years since quittin.8    Smokeless tobacco: Never   Vaping Use    Vaping Use: Never used   Substance and Sexual Activity    Alcohol use: Not Currently    Drug use: Never    Sexual activity: Not on file   Other Topics Concern    Not on file   Social History Narrative    Not on file     Social Determinants of Health     Financial Resource Strain: Not on file   Food Insecurity: Not on file   Transportation Needs: Not on file   Physical Activity: Not on file   Stress: Not on file   Social  "Connections: Not on file   Intimate Partner Violence: Not on file   Housing Stability: Not on file       Review of Systems:  Review of Systems   Constitutional:  Negative for chills and fever.   Respiratory:  Negative for shortness of breath.    Cardiovascular:  Negative for chest pain.   Neurological:  Positive for sensory change. Negative for weakness.       Physical Exam:  /60 (BP Location: Right arm, Patient Position: Sitting)   Pulse 78   Temp 36.6 °C (97.8 °F) (Temporal)   Ht 1.676 m (5' 6\")   Wt 83.9 kg (185 lb)   SpO2 97%   BMI 29.86 kg/m²     Physical Exam  Constitutional:       General: She is not in acute distress.     Appearance: Normal appearance. She is not ill-appearing.   HENT:      Head: Normocephalic and atraumatic.   Pulmonary:      Effort: Pulmonary effort is normal. No respiratory distress.      Breath sounds: No stridor. No wheezing.   Abdominal:      General: There is no distension.   Musculoskeletal:      Cervical back: Normal range of motion and neck supple.      Comments: RLE: Healing surgical wound over proximal medial thigh with minimal surrounding radiation changes. Drain in place with serosanguinous drainage. No purulence from wound or spreading erythema. SILT spn, dpn, plantar and sural nerves. Decreased in saphenous distribution. Motor intact to ankle PF/DF.   Skin:     General: Skin is warm and dry.      Capillary Refill: Capillary refill takes less than 2 seconds.   Neurological:      General: No focal deficit present.      Mental Status: She is alert and oriented to person, place, and time.   Psychiatric:         Mood and Affect: Mood normal.         Behavior: Behavior normal.         Assessment and Plan:  Right thigh high grade leiomyosarcoma s/p wide resection of tumor bed on 11/15/23  - She is doing well with her recovery. Sutures were removed today and steri strips placed. Drain will stay in place until <30 cc per day or for an additional 2 weeks and then be " removed. She was instructed on how to remove at home and feels comfortable with this. We reviewed her pathology results. She will now go into high grade surveillance and per NCCN guidelines this will include CT chest without contrast and MRI femur with/without contrast every 3 months for the first two years. These will be completed in February and she will follow up after to discuss her imaging. All questions were answered, she verbalized understanding and was in agreement with the plan.       Referrals: none  Restrictions: No heavy lifting or straining until wound completely scarred and drain removed  New medications/refills: none  Imaging studies: MRI right femur w/wo, CT chest wo  Follow-up: February 2024 after surveillance imaging complete    Abdoulaye Sainz DO  Orthopedics and Orthopedic Oncology

## 2023-12-15 NOTE — PROGRESS NOTES
Cancer Conference:   Conference Date: 12/15/23    Presenter: DIEUDONNE Burgess disease status and treatment plans were discussed at multi-disciplinary   cancer conference. This included review of current patient assessments, updated imaging, and   available pathology results. Other relevant factors considered, including as medical   oncology genetics, nurse navigation, and social work.    Current Diagnosis:     S/p quad resection and biopsy was likely leiomyoma. S/p excision and came back with LMS followed by radiation. S/p second resection and path with no residual tumor.     Updated Assessment / Status / Plan:     No further treatment and plan for surveillance.

## 2023-12-20 DIAGNOSIS — D64.89 ANEMIA DUE TO OTHER CAUSE, NOT CLASSIFIED: ICD-10-CM

## 2023-12-20 NOTE — PROGRESS NOTES
"Patient called stating she was feeling light headed and that it seemed her drain was putting out more \"bloody\" drainage than before. She has about 90 cc of output per day. I recommended she increase her fluid intake and have also ordered a CBC to be obtained today at the [facility] lab and we'll request those results.     Abdoulaye Sainz, DO  Orthopedic Oncology and General Orthopedics   [facility]    "

## 2023-12-26 ENCOUNTER — TELEPHONE (OUTPATIENT)
Dept: SURGICAL ONCOLOGY | Facility: MEDICAL CENTER | Age: 73
End: 2023-12-26
Payer: MEDICARE

## 2023-12-26 NOTE — TELEPHONE ENCOUNTER
Debo called on 12/24 reporting an area adjacent to the surgical scar that was swollen, erythematous and had superficial desquamation. I called in a prescription for keflex. We talked again today. That same site is draining and she sent a picture. It is draining serous fluid and there is surrounding erythema. I recommended she stay on the keflex and keep changing the gauze, but allow it to drain. She will also noa the erythematous area with a marker and if it starts to go past the outline will call in and I will broaden her antibiotic coverage. I will check back in with her tomorrow as well.    Abdoulaye Sainz, DO  Orthopedic Oncology and General Orthopedics   [facility]

## 2023-12-28 ENCOUNTER — TELEPHONE (OUTPATIENT)
Dept: SURGICAL ONCOLOGY | Facility: MEDICAL CENTER | Age: 73
End: 2023-12-28

## 2023-12-28 ENCOUNTER — OFFICE VISIT (OUTPATIENT)
Dept: SURGICAL ONCOLOGY | Facility: MEDICAL CENTER | Age: 73
End: 2023-12-28
Payer: MEDICARE

## 2023-12-28 VITALS
SYSTOLIC BLOOD PRESSURE: 122 MMHG | WEIGHT: 184 LBS | DIASTOLIC BLOOD PRESSURE: 64 MMHG | TEMPERATURE: 97.7 F | HEIGHT: 66 IN | BODY MASS INDEX: 29.57 KG/M2 | OXYGEN SATURATION: 95 % | HEART RATE: 82 BPM

## 2023-12-28 DIAGNOSIS — C49.21: ICD-10-CM

## 2023-12-28 DIAGNOSIS — T81.31XA POSTOPERATIVE DEHISCENCE OF SKIN WOUND, INITIAL ENCOUNTER: Primary | ICD-10-CM

## 2023-12-28 PROCEDURE — 3078F DIAST BP <80 MM HG: CPT | Performed by: ORTHOPAEDIC SURGERY

## 2023-12-28 PROCEDURE — 3074F SYST BP LT 130 MM HG: CPT | Performed by: ORTHOPAEDIC SURGERY

## 2023-12-28 PROCEDURE — 99024 POSTOP FOLLOW-UP VISIT: CPT | Performed by: ORTHOPAEDIC SURGERY

## 2023-12-28 RX ORDER — CEPHALEXIN 500 MG/1
500 CAPSULE ORAL 4 TIMES DAILY
COMMUNITY
Start: 2023-12-24

## 2023-12-28 ASSESSMENT — FIBROSIS 4 INDEX: FIB4 SCORE: 1.33

## 2023-12-28 ASSESSMENT — ENCOUNTER SYMPTOMS
SHORTNESS OF BREATH: 0
SENSORY CHANGE: 1
CHILLS: 0
WEAKNESS: 0
FEVER: 0

## 2023-12-28 NOTE — TELEPHONE ENCOUNTER
Bertha from [facility] called in regards to the wound care referral, the soonest they can see Debo will be on 1/11 due to the holidays, she is also placed on a cancellation list. Patient was not okay with how far the date is.

## 2023-12-28 NOTE — PROGRESS NOTES
Diagnosis:  Right thigh leiomyosarcoma    Surgical Interventions:   Non oncologic resection outside surgeon 8/4/23  Wide resection right thigh tumor bed 11/15/23    Radiation:  Neoadjuvant 50 Gy    Chemotherapy:  NA    HPI: Debo returns for follow up today for a wound check. She had call stating a wound developed adjacent to the surgical scar that was draining serous fluid and had become more erythematous. She was started on keflex. She thinks this helped some, but the wound is still draining a fair amount. Denies fevers or chills.     Past Medical History:   Past Medical History:   Diagnosis Date    Cancer (HCC)     Leiomyosarcoma    Cataract     IOL OU    Leiomyosarcoma (HCC)     Rosacea        Past Surgical History:  Past Surgical History:   Procedure Laterality Date    WIDE EXCISION, LESION, LOWER EXTREMITY Right 11/15/2023    Procedure: WIDE RESECTION OF RIGHT THIGH SARCOMA, WOUND VAC;  Surgeon: Abdoulaye Sainz D.O.;  Location: SURGERY [facility];  Service: Orthopedics    TUMOR EXCISION WITH BIOPSY Right 08/04/2023    right thigh    ABDOMINAL SUBTOTAL HYSTERECTOMY      CATARACT EXTRACTION WITH IOL Bilateral     OTHER ORTHOPEDIC SURGERY      Bilateral Feet       Outpatient Encounter Medications as of 12/28/2023   Medication Sig Dispense Refill    cephALEXin (KEFLEX) 500 MG Cap Take 500 mg by mouth 4 times a day. FOR 7 DAYS      nicotine polacrilex (NICORETTE) 2 MG Gum Take 2 mg by mouth see administration instructions. Up to 8 times per day as needed.      bisacodyl EC (DULCOLAX) 5 MG Tablet Delayed Response Take 5 mg by mouth 1 time a day as needed for Constipation (typically 1 x week).      SSD 1 % Cream APPLY TOPICALLY TO RIGHT GROIN THREE TIMES DAILY      Apoaequorin (PREVAGEN PO) Take 1 Tablet by mouth every day.      Wheat Dextrin (BENEFIBER PO) Take 1 Capsule by mouth 2 times a day as needed.      alendronate (FOSAMAX) 70 MG Tab Take 70 mg by mouth every 7 days. MONDAYS      estradiol  (VIVELLE DOT) 0.1 MG/24HR PATCH BIWEEKLY Place 1 Patch on the skin two times a week.      metronidazole (METROCREAM) 0.75 % cream Apply 1 Application topically every day.      Multiple Vitamins-Minerals (CENTRUM SILVER 50+WOMEN PO) Take 1 Tablet by mouth every day.      NON SPECIFIED Take 1 Tablet by mouth 1 time a day as needed (nerve pain). NERVIVE: RELIEFS NERVE ACHES, DISCOMFORT, AND WEAKNESS.  Ingredients: Alpha-Lipoic Acid, B Complex vitamins, tumeric, and ginger       No facility-administered encounter medications on file as of 2023.        Allergies:   No Known Allergies    Family History:   Family History   Problem Relation Age of Onset    Lung Cancer Sister        Social History:   Social History     Tobacco Use   Smoking Status Former    Current packs/day: 0.00    Types: Cigarettes    Quit date: 2023    Years since quittin.9   Smokeless Tobacco Never     Social History     Substance and Sexual Activity   Alcohol Use Not Currently     Social History     Substance and Sexual Activity   Drug Use Never     Social History     Socioeconomic History    Marital status:      Spouse name: Not on file    Number of children: Not on file    Years of education: Not on file    Highest education level: Not on file   Occupational History    Not on file   Tobacco Use    Smoking status: Former     Current packs/day: 0.00     Types: Cigarettes     Quit date: 2023     Years since quittin.9    Smokeless tobacco: Never   Vaping Use    Vaping Use: Never used   Substance and Sexual Activity    Alcohol use: Not Currently    Drug use: Never    Sexual activity: Not on file   Other Topics Concern    Not on file   Social History Narrative    Not on file     Social Determinants of Health     Financial Resource Strain: Not on file   Food Insecurity: Not on file   Transportation Needs: Not on file   Physical Activity: Not on file   Stress: Not on file   Social Connections: Not on file   Intimate Partner  "Violence: Not on file   Housing Stability: Not on file       Review of Systems:  Review of Systems   Constitutional:  Negative for chills and fever.   Respiratory:  Negative for shortness of breath.    Cardiovascular:  Negative for chest pain.   Neurological:  Positive for sensory change. Negative for weakness.        Decreased sensation in saphenous distribution of thigh       Physical Exam:  /64 (BP Location: Right arm, Patient Position: Sitting)   Pulse 82   Temp 36.5 °C (97.7 °F) (Temporal)   Ht 1.676 m (5' 6\")   Wt 83.5 kg (184 lb)   SpO2 95%   BMI 29.70 kg/m²     Physical Exam  Constitutional:       General: She is not in acute distress.     Appearance: Normal appearance. She is not ill-appearing.   HENT:      Head: Normocephalic and atraumatic.   Pulmonary:      Effort: Pulmonary effort is normal. No respiratory distress.      Breath sounds: No stridor. No wheezing.   Abdominal:      General: There is no distension.   Musculoskeletal:      Cervical back: Normal range of motion and neck supple.      Comments: RLE: Healed surgical wound over proximal medial thigh with stable surrounding radiation changes. There is a wound that is approximately 2x3 cm and 1 cm deep that I unroofed. No purulence just fibrinous tissue with serous drainage. SILT spn, dpn, plantar and sural nerves. Decreased in saphenous distribution. Motor intact to ankle PF/DF.   Skin:     General: Skin is warm and dry.      Capillary Refill: Capillary refill takes less than 2 seconds.   Neurological:      General: No focal deficit present.      Mental Status: She is alert and oriented to person, place, and time.   Psychiatric:         Mood and Affect: Mood normal.         Behavior: Behavior normal.         Assessment and Plan:  Right thigh high grade leiomyosarcoma s/p wide resection of tumor bed on 11/15/23  - Unfortunately developed a wound adjacent to the surgical site that is possibly secondary to a lymphocele or small superficial " abscess. I unroofed it today and placed a wet to dry dressing. I instructed her on dressing changes and will refer her to wound care. She will complete her course of antibiotics as well. I will see her back next week.        Referrals: [facility]  Restrictions: Daily dressing changes wet to dry  New medications/refills: none  Imaging studies: MRI right femur w/wo, CT chest wo at 3 months post op  Follow-up: next week    Abdoulaye Sainz DO  Orthopedics and Orthopedic Oncology

## 2024-01-02 ENCOUNTER — OFFICE VISIT (OUTPATIENT)
Dept: SURGICAL ONCOLOGY | Facility: MEDICAL CENTER | Age: 74
End: 2024-01-02
Payer: MEDICARE

## 2024-01-02 VITALS
TEMPERATURE: 96.9 F | SYSTOLIC BLOOD PRESSURE: 122 MMHG | BODY MASS INDEX: 29.41 KG/M2 | WEIGHT: 183 LBS | HEIGHT: 66 IN | DIASTOLIC BLOOD PRESSURE: 76 MMHG | HEART RATE: 82 BPM | OXYGEN SATURATION: 96 %

## 2024-01-02 DIAGNOSIS — T81.31XA POSTOPERATIVE DEHISCENCE OF SKIN WOUND, INITIAL ENCOUNTER: ICD-10-CM

## 2024-01-02 DIAGNOSIS — C49.21: ICD-10-CM

## 2024-01-02 PROCEDURE — 99024 POSTOP FOLLOW-UP VISIT: CPT | Performed by: ORTHOPAEDIC SURGERY

## 2024-01-02 PROCEDURE — 3078F DIAST BP <80 MM HG: CPT | Performed by: ORTHOPAEDIC SURGERY

## 2024-01-02 PROCEDURE — 3074F SYST BP LT 130 MM HG: CPT | Performed by: ORTHOPAEDIC SURGERY

## 2024-01-02 ASSESSMENT — FIBROSIS 4 INDEX: FIB4 SCORE: 1.33

## 2024-01-02 ASSESSMENT — ENCOUNTER SYMPTOMS
CHILLS: 0
FEVER: 0
SENSORY CHANGE: 1
WEAKNESS: 0
SHORTNESS OF BREATH: 0

## 2024-01-02 NOTE — PROGRESS NOTES
Diagnosis:  Right thigh leiomyosarcoma    Surgical Interventions:   Non oncologic resection outside surgeon 8/4/23  Wide resection right thigh tumor bed 11/15/23    Radiation:  Neoadjuvant 50 Gy    Chemotherapy:  NA    HPI: Debo returns for follow up today for a wound check. She reports the thigh is feeling much better and is without pain. No fevers or chills. Drainage is about the same from the wound. No surrounding redness.     Past Medical History:   Past Medical History:   Diagnosis Date    Cancer (HCC)     Leiomyosarcoma    Cataract     IOL OU    Leiomyosarcoma (HCC)     Rosacea        Past Surgical History:  Past Surgical History:   Procedure Laterality Date    WIDE EXCISION, LESION, LOWER EXTREMITY Right 11/15/2023    Procedure: WIDE RESECTION OF RIGHT THIGH SARCOMA, WOUND VAC;  Surgeon: Abdoulaye Sainz D.O.;  Location: SURGERY [facility];  Service: Orthopedics    TUMOR EXCISION WITH BIOPSY Right 08/04/2023    right thigh    ABDOMINAL SUBTOTAL HYSTERECTOMY      CATARACT EXTRACTION WITH IOL Bilateral     OTHER ORTHOPEDIC SURGERY      Bilateral Feet       Outpatient Encounter Medications as of 1/2/2024   Medication Sig Dispense Refill    cephALEXin (KEFLEX) 500 MG Cap Take 500 mg by mouth 4 times a day. FOR 7 DAYS      nicotine polacrilex (NICORETTE) 2 MG Gum Take 2 mg by mouth see administration instructions. Up to 8 times per day as needed.      bisacodyl EC (DULCOLAX) 5 MG Tablet Delayed Response Take 5 mg by mouth 1 time a day as needed for Constipation (typically 1 x week).      SSD 1 % Cream APPLY TOPICALLY TO RIGHT GROIN THREE TIMES DAILY      Apoaequorin (PREVAGEN PO) Take 1 Tablet by mouth every day.      Wheat Dextrin (BENEFIBER PO) Take 1 Capsule by mouth 2 times a day as needed.      alendronate (FOSAMAX) 70 MG Tab Take 70 mg by mouth every 7 days. MONDAYS      estradiol (VIVELLE DOT) 0.1 MG/24HR PATCH BIWEEKLY Place 1 Patch on the skin two times a week.      metronidazole  (METROCREAM) 0.75 % cream Apply 1 Application topically every day.      Multiple Vitamins-Minerals (CENTRUM SILVER 50+WOMEN PO) Take 1 Tablet by mouth every day.      NON SPECIFIED Take 1 Tablet by mouth 1 time a day as needed (nerve pain). NERVIVE: RELIEFS NERVE ACHES, DISCOMFORT, AND WEAKNESS.  Ingredients: Alpha-Lipoic Acid, B Complex vitamins, tumeric, and ginger       No facility-administered encounter medications on file as of 2024.        Allergies:   No Known Allergies    Family History:   Family History   Problem Relation Age of Onset    Lung Cancer Sister        Social History:   Social History     Tobacco Use   Smoking Status Former    Current packs/day: 0.00    Types: Cigarettes    Quit date: 2023    Years since quittin.9   Smokeless Tobacco Never     Social History     Substance and Sexual Activity   Alcohol Use Not Currently     Social History     Substance and Sexual Activity   Drug Use Never     Social History     Socioeconomic History    Marital status:      Spouse name: Not on file    Number of children: Not on file    Years of education: Not on file    Highest education level: Not on file   Occupational History    Not on file   Tobacco Use    Smoking status: Former     Current packs/day: 0.00     Types: Cigarettes     Quit date: 2023     Years since quittin.9    Smokeless tobacco: Never   Vaping Use    Vaping Use: Never used   Substance and Sexual Activity    Alcohol use: Not Currently    Drug use: Never    Sexual activity: Not on file   Other Topics Concern    Not on file   Social History Narrative    Not on file     Social Determinants of Health     Financial Resource Strain: Not on file   Food Insecurity: Not on file   Transportation Needs: Not on file   Physical Activity: Not on file   Stress: Not on file   Social Connections: Not on file   Intimate Partner Violence: Not on file   Housing Stability: Not on file       Review of Systems:  Review of Systems  "  Constitutional:  Negative for chills and fever.   Respiratory:  Negative for shortness of breath.    Cardiovascular:  Negative for chest pain.   Neurological:  Positive for sensory change. Negative for weakness.        Decreased sensation in saphenous distribution of thigh       Physical Exam:  /76 (BP Location: Right arm, Patient Position: Sitting, BP Cuff Size: Small adult)   Pulse 82   Temp 36.1 °C (96.9 °F) (Temporal)   Ht 1.676 m (5' 6\")   Wt 83 kg (183 lb)   SpO2 96%   BMI 29.54 kg/m²     Physical Exam  Constitutional:       General: She is not in acute distress.     Appearance: Normal appearance. She is not ill-appearing.   HENT:      Head: Normocephalic and atraumatic.   Pulmonary:      Effort: Pulmonary effort is normal. No respiratory distress.      Breath sounds: No stridor. No wheezing.   Abdominal:      General: There is no distension.   Musculoskeletal:      Cervical back: Normal range of motion and neck supple.      Comments: RLE: Healed surgical wound over proximal medial thigh with stable surrounding radiation changes. There is a wound that is approximately 2x3 cm and 1 cm deep. No purulence just fibrinous tissue with serous drainage. No surrounding erythema. SILT spn, dpn, plantar and sural nerves. Decreased in saphenous distribution. Motor intact to ankle PF/DF.   Skin:     General: Skin is warm and dry.      Capillary Refill: Capillary refill takes less than 2 seconds.   Neurological:      General: No focal deficit present.      Mental Status: She is alert and oriented to person, place, and time.   Psychiatric:         Mood and Affect: Mood normal.         Behavior: Behavior normal.         Assessment and Plan:  Right thigh high grade leiomyosarcoma s/p wide resection of tumor bed on 11/15/23  2.   Post-operative wound adjacent to to surgical incision  - no evidence of infection. Wet to dry dressing changed today. Tolerated well. She is scheduled to see wound care on 1/11/24. We " discussed and plan to continue daily dressing changes with wet to dry until then. I will see her again this Thursday and next Monday to check the wound.       Referrals: [facility]  Restrictions: Daily dressing changes wet to dry  New medications/refills: none  Imaging studies: MRI right femur w/wo, CT chest wo at 3 months post op (February 2024)  Follow-up: Thursday     Abdoulaye Sainz DO  Orthopedics and Orthopedic Oncology

## 2024-01-04 ENCOUNTER — OFFICE VISIT (OUTPATIENT)
Dept: SURGICAL ONCOLOGY | Facility: MEDICAL CENTER | Age: 74
End: 2024-01-04
Payer: MEDICARE

## 2024-01-04 VITALS
SYSTOLIC BLOOD PRESSURE: 112 MMHG | HEART RATE: 78 BPM | HEIGHT: 66 IN | BODY MASS INDEX: 29.73 KG/M2 | DIASTOLIC BLOOD PRESSURE: 66 MMHG | OXYGEN SATURATION: 97 % | TEMPERATURE: 97.9 F | WEIGHT: 185 LBS

## 2024-01-04 DIAGNOSIS — T81.31XA POSTOPERATIVE DEHISCENCE OF SKIN WOUND, INITIAL ENCOUNTER: ICD-10-CM

## 2024-01-04 DIAGNOSIS — C49.21: ICD-10-CM

## 2024-01-04 PROCEDURE — 3074F SYST BP LT 130 MM HG: CPT | Performed by: ORTHOPAEDIC SURGERY

## 2024-01-04 PROCEDURE — 99024 POSTOP FOLLOW-UP VISIT: CPT | Performed by: ORTHOPAEDIC SURGERY

## 2024-01-04 PROCEDURE — 3078F DIAST BP <80 MM HG: CPT | Performed by: ORTHOPAEDIC SURGERY

## 2024-01-04 ASSESSMENT — ENCOUNTER SYMPTOMS
SENSORY CHANGE: 1
WEAKNESS: 0
FEVER: 0
SHORTNESS OF BREATH: 0
CHILLS: 0

## 2024-01-04 ASSESSMENT — FIBROSIS 4 INDEX: FIB4 SCORE: 1.33

## 2024-01-04 NOTE — PROGRESS NOTES
Diagnosis:  Right thigh leiomyosarcoma    Surgical Interventions:   Non oncologic resection outside surgeon 8/4/23  Wide resection right thigh tumor bed 11/15/23    Radiation:  Neoadjuvant 50 Gy    Chemotherapy:  NA    HPI: Debo returns for follow up today for a wound check. She reports drainage is perhaps a little increased from the wound. No fevers, chills or spreading erythema. Scheduled to see wound care next Thursday.    Past Medical History:   Past Medical History:   Diagnosis Date    Cancer (HCC)     Leiomyosarcoma    Cataract     IOL OU    Leiomyosarcoma (HCC)     Rosacea        Past Surgical History:  Past Surgical History:   Procedure Laterality Date    WIDE EXCISION, LESION, LOWER EXTREMITY Right 11/15/2023    Procedure: WIDE RESECTION OF RIGHT THIGH SARCOMA, WOUND VAC;  Surgeon: Abdoulaye Sainz D.O.;  Location: SURGERY [facility];  Service: Orthopedics    TUMOR EXCISION WITH BIOPSY Right 08/04/2023    right thigh    ABDOMINAL SUBTOTAL HYSTERECTOMY      CATARACT EXTRACTION WITH IOL Bilateral     OTHER ORTHOPEDIC SURGERY      Bilateral Feet       Outpatient Encounter Medications as of 1/4/2024   Medication Sig Dispense Refill    cephALEXin (KEFLEX) 500 MG Cap Take 500 mg by mouth 4 times a day. FOR 7 DAYS      nicotine polacrilex (NICORETTE) 2 MG Gum Take 2 mg by mouth see administration instructions. Up to 8 times per day as needed.      bisacodyl EC (DULCOLAX) 5 MG Tablet Delayed Response Take 5 mg by mouth 1 time a day as needed for Constipation (typically 1 x week).      SSD 1 % Cream APPLY TOPICALLY TO RIGHT GROIN THREE TIMES DAILY      Apoaequorin (PREVAGEN PO) Take 1 Tablet by mouth every day.      Wheat Dextrin (BENEFIBER PO) Take 1 Capsule by mouth 2 times a day as needed.      alendronate (FOSAMAX) 70 MG Tab Take 70 mg by mouth every 7 days. MONDAYS      estradiol (VIVELLE DOT) 0.1 MG/24HR PATCH BIWEEKLY Place 1 Patch on the skin two times a week.      metronidazole (METROCREAM)  0.75 % cream Apply 1 Application topically every day.      Multiple Vitamins-Minerals (CENTRUM SILVER 50+WOMEN PO) Take 1 Tablet by mouth every day.      NON SPECIFIED Take 1 Tablet by mouth 1 time a day as needed (nerve pain). NERVIVE: RELIEFS NERVE ACHES, DISCOMFORT, AND WEAKNESS.  Ingredients: Alpha-Lipoic Acid, B Complex vitamins, tumeric, and ginger       No facility-administered encounter medications on file as of 2024.        Allergies:   No Known Allergies    Family History:   Family History   Problem Relation Age of Onset    Lung Cancer Sister        Social History:   Social History     Tobacco Use   Smoking Status Former    Current packs/day: 0.00    Types: Cigarettes    Quit date: 2023    Years since quittin.9   Smokeless Tobacco Never     Social History     Substance and Sexual Activity   Alcohol Use Not Currently     Social History     Substance and Sexual Activity   Drug Use Never     Social History     Socioeconomic History    Marital status:      Spouse name: Not on file    Number of children: Not on file    Years of education: Not on file    Highest education level: Not on file   Occupational History    Not on file   Tobacco Use    Smoking status: Former     Current packs/day: 0.00     Types: Cigarettes     Quit date: 2023     Years since quittin.9    Smokeless tobacco: Never   Vaping Use    Vaping Use: Never used   Substance and Sexual Activity    Alcohol use: Not Currently    Drug use: Never    Sexual activity: Not on file   Other Topics Concern    Not on file   Social History Narrative    Not on file     Social Determinants of Health     Financial Resource Strain: Not on file   Food Insecurity: Not on file   Transportation Needs: Not on file   Physical Activity: Not on file   Stress: Not on file   Social Connections: Not on file   Intimate Partner Violence: Not on file   Housing Stability: Not on file       Review of Systems:  Review of Systems   Constitutional:   "Negative for chills and fever.   Respiratory:  Negative for shortness of breath.    Cardiovascular:  Negative for chest pain.   Neurological:  Positive for sensory change. Negative for weakness.        Decreased sensation in saphenous distribution of thigh       Physical Exam:  /66 (BP Location: Right arm, Patient Position: Sitting, BP Cuff Size: Small adult)   Pulse 78   Temp 36.6 °C (97.9 °F) (Temporal)   Ht 1.676 m (5' 6\")   Wt 83.9 kg (185 lb)   SpO2 97%   BMI 29.86 kg/m²     Physical Exam  Constitutional:       General: She is not in acute distress.     Appearance: Normal appearance. She is not ill-appearing.   HENT:      Head: Normocephalic and atraumatic.   Pulmonary:      Effort: Pulmonary effort is normal. No respiratory distress.      Breath sounds: No stridor. No wheezing.   Abdominal:      General: There is no distension.   Musculoskeletal:      Cervical back: Normal range of motion and neck supple.      Comments: RLE: Healed surgical wound over proximal medial thigh with stable surrounding radiation changes. There is a wound that is approximately 2x3 cm and 1 cm deep. No purulence just fibrinous tissue with serous drainage. No surrounding erythema. SILT spn, dpn, plantar and sural nerves. Decreased in saphenous distribution. Motor intact to ankle PF/DF.   Skin:     General: Skin is warm and dry.      Capillary Refill: Capillary refill takes less than 2 seconds.   Neurological:      General: No focal deficit present.      Mental Status: She is alert and oriented to person, place, and time.   Psychiatric:         Mood and Affect: Mood normal.         Behavior: Behavior normal.         Assessment and Plan:  Right thigh high grade leiomyosarcoma s/p wide resection of tumor bed on 11/15/23  2.   Post-operative wound adjacent to surgical incision  - no evidence of infection. Wet to dry dressing changed today. Tolerated well. She is scheduled to see wound care on 1/11/24. We discussed and plan to " continue daily dressing changes with wet to dry until then. I will see her again this next Monday to check the wound.       Referrals: [facility]  Restrictions: Daily dressing changes wet to dry  New medications/refills: none  Imaging studies: MRI right femur w/wo, CT chest wo at 3 months post op (February 2024)  Follow-up: Monday 1/8     Abdoulaye Sainz DO  Orthopedics and Orthopedic Oncology

## 2024-01-08 ENCOUNTER — OFFICE VISIT (OUTPATIENT)
Dept: SURGICAL ONCOLOGY | Facility: MEDICAL CENTER | Age: 74
End: 2024-01-08
Payer: MEDICARE

## 2024-01-08 VITALS
TEMPERATURE: 98.4 F | OXYGEN SATURATION: 96 % | WEIGHT: 182 LBS | BODY MASS INDEX: 29.25 KG/M2 | HEART RATE: 75 BPM | SYSTOLIC BLOOD PRESSURE: 120 MMHG | HEIGHT: 66 IN | DIASTOLIC BLOOD PRESSURE: 82 MMHG

## 2024-01-08 DIAGNOSIS — T81.31XA POSTOPERATIVE DEHISCENCE OF SKIN WOUND, INITIAL ENCOUNTER: ICD-10-CM

## 2024-01-08 DIAGNOSIS — C49.21: ICD-10-CM

## 2024-01-08 PROCEDURE — 3079F DIAST BP 80-89 MM HG: CPT | Performed by: ORTHOPAEDIC SURGERY

## 2024-01-08 PROCEDURE — 3074F SYST BP LT 130 MM HG: CPT | Performed by: ORTHOPAEDIC SURGERY

## 2024-01-08 PROCEDURE — 99024 POSTOP FOLLOW-UP VISIT: CPT | Performed by: ORTHOPAEDIC SURGERY

## 2024-01-08 ASSESSMENT — FIBROSIS 4 INDEX: FIB4 SCORE: 1.33

## 2024-01-09 ASSESSMENT — ENCOUNTER SYMPTOMS
FEVER: 0
SENSORY CHANGE: 1
WEAKNESS: 0
CHILLS: 0
SHORTNESS OF BREATH: 0

## 2024-01-09 NOTE — PROGRESS NOTES
Diagnosis:  Right thigh leiomyosarcoma    Surgical Interventions:   Non oncologic resection outside surgeon 8/4/23  Wide resection right thigh tumor bed 11/15/23    Radiation:  Neoadjuvant 50 Gy    Chemotherapy:  NA    HPI: Debo returns for follow up today for a wound check. She reports drainage is stable from the wound. No fevers, chills or spreading erythema. Scheduled to see wound care Thursday.    Past Medical History:   Past Medical History:   Diagnosis Date    Cancer (HCC)     Leiomyosarcoma    Cataract     IOL OU    Leiomyosarcoma (HCC)     Rosacea        Past Surgical History:  Past Surgical History:   Procedure Laterality Date    WIDE EXCISION, LESION, LOWER EXTREMITY Right 11/15/2023    Procedure: WIDE RESECTION OF RIGHT THIGH SARCOMA, WOUND VAC;  Surgeon: Abdoulaye Sainz D.O.;  Location: SURGERY [facility];  Service: Orthopedics    TUMOR EXCISION WITH BIOPSY Right 08/04/2023    right thigh    ABDOMINAL SUBTOTAL HYSTERECTOMY      CATARACT EXTRACTION WITH IOL Bilateral     OTHER ORTHOPEDIC SURGERY      Bilateral Feet       Outpatient Encounter Medications as of 1/8/2024   Medication Sig Dispense Refill    cephALEXin (KEFLEX) 500 MG Cap Take 500 mg by mouth 4 times a day. FOR 7 DAYS      nicotine polacrilex (NICORETTE) 2 MG Gum Take 2 mg by mouth see administration instructions. Up to 8 times per day as needed.      bisacodyl EC (DULCOLAX) 5 MG Tablet Delayed Response Take 5 mg by mouth 1 time a day as needed for Constipation (typically 1 x week).      SSD 1 % Cream APPLY TOPICALLY TO RIGHT GROIN THREE TIMES DAILY      Apoaequorin (PREVAGEN PO) Take 1 Tablet by mouth every day.      Wheat Dextrin (BENEFIBER PO) Take 1 Capsule by mouth 2 times a day as needed.      alendronate (FOSAMAX) 70 MG Tab Take 70 mg by mouth every 7 days. MONDAYS      estradiol (VIVELLE DOT) 0.1 MG/24HR PATCH BIWEEKLY Place 1 Patch on the skin two times a week.      metronidazole (METROCREAM) 0.75 % cream Apply 1  Application topically every day.      Multiple Vitamins-Minerals (CENTRUM SILVER 50+WOMEN PO) Take 1 Tablet by mouth every day.      NON SPECIFIED Take 1 Tablet by mouth 1 time a day as needed (nerve pain). NERVIVE: RELIEFS NERVE ACHES, DISCOMFORT, AND WEAKNESS.  Ingredients: Alpha-Lipoic Acid, B Complex vitamins, tumeric, and ginger       No facility-administered encounter medications on file as of 2024.        Allergies:   No Known Allergies    Family History:   Family History   Problem Relation Age of Onset    Lung Cancer Sister        Social History:   Social History     Tobacco Use   Smoking Status Former    Current packs/day: 0.00    Types: Cigarettes    Quit date: 2023    Years since quittin.9   Smokeless Tobacco Never     Social History     Substance and Sexual Activity   Alcohol Use Not Currently     Social History     Substance and Sexual Activity   Drug Use Never     Social History     Socioeconomic History    Marital status:      Spouse name: Not on file    Number of children: Not on file    Years of education: Not on file    Highest education level: Not on file   Occupational History    Not on file   Tobacco Use    Smoking status: Former     Current packs/day: 0.00     Types: Cigarettes     Quit date: 2023     Years since quittin.9    Smokeless tobacco: Never   Vaping Use    Vaping Use: Never used   Substance and Sexual Activity    Alcohol use: Not Currently    Drug use: Never    Sexual activity: Not on file   Other Topics Concern    Not on file   Social History Narrative    Not on file     Social Determinants of Health     Financial Resource Strain: Not on file   Food Insecurity: Not on file   Transportation Needs: Not on file   Physical Activity: Not on file   Stress: Not on file   Social Connections: Not on file   Intimate Partner Violence: Not on file   Housing Stability: Not on file       Review of Systems:  Review of Systems   Constitutional:  Negative for chills and  "fever.   Respiratory:  Negative for shortness of breath.    Cardiovascular:  Negative for chest pain.   Neurological:  Positive for sensory change. Negative for weakness.        Decreased sensation in saphenous distribution of thigh       Physical Exam:  /82 (BP Location: Right arm, Patient Position: Sitting, BP Cuff Size: Small adult)   Pulse 75   Temp 36.9 °C (98.4 °F) (Temporal)   Ht 1.676 m (5' 6\")   Wt 82.6 kg (182 lb)   SpO2 96%   BMI 29.38 kg/m²     Physical Exam  Constitutional:       General: She is not in acute distress.     Appearance: Normal appearance. She is not ill-appearing.   HENT:      Head: Normocephalic and atraumatic.   Pulmonary:      Effort: Pulmonary effort is normal. No respiratory distress.      Breath sounds: No stridor. No wheezing.   Abdominal:      General: There is no distension.   Musculoskeletal:      Cervical back: Normal range of motion and neck supple.      Comments: RLE: Healed surgical wound over proximal medial thigh with stable surrounding radiation changes. There is a wound that is approximately 2x3 cm and 1 cm deep. No purulence just fibrinous tissue with serous drainage. No surrounding erythema. SILT spn, dpn, plantar and sural nerves. Decreased in saphenous distribution. Motor intact to ankle PF/DF.   Skin:     General: Skin is warm and dry.      Capillary Refill: Capillary refill takes less than 2 seconds.   Neurological:      General: No focal deficit present.      Mental Status: She is alert and oriented to person, place, and time.   Psychiatric:         Mood and Affect: Mood normal.         Behavior: Behavior normal.         Assessment and Plan:  Right thigh high grade leiomyosarcoma s/p wide resection of tumor bed on 11/15/23  2.   Post-operative wound adjacent to surgical incision  - no evidence of infection. Wet to dry dressing changed today and the wound was mechanically debrided with gauze and saline. Tolerated well. She is scheduled to see wound care " on 1/11/24. We discussed the plan to continue daily dressing changes with wet to dry until then.    Referrals: [facility]  Restrictions: Daily dressing changes wet to dry  New medications/refills: none  Imaging studies: MRI right femur w/wo, CT chest wo at 3 months post op (February 2024)  Follow-up: February 2024 for surveillance or sooner if wound issues/concerns after starting wound care    Abdoulaye Sainz DO  Orthopedics and Orthopedic Oncology

## 2024-01-15 ENCOUNTER — APPOINTMENT (OUTPATIENT)
Dept: SURGICAL ONCOLOGY | Facility: MEDICAL CENTER | Age: 74
End: 2024-01-15
Payer: MEDICARE

## 2024-02-23 ENCOUNTER — HOSPITAL ENCOUNTER (OUTPATIENT)
Dept: RADIOLOGY | Facility: MEDICAL CENTER | Age: 74
End: 2024-02-23
Attending: ORTHOPAEDIC SURGERY
Payer: MEDICARE

## 2024-02-29 ENCOUNTER — OFFICE VISIT (OUTPATIENT)
Dept: SURGICAL ONCOLOGY | Facility: MEDICAL CENTER | Age: 74
End: 2024-02-29
Payer: MEDICARE

## 2024-02-29 VITALS
DIASTOLIC BLOOD PRESSURE: 78 MMHG | HEART RATE: 86 BPM | TEMPERATURE: 97.9 F | SYSTOLIC BLOOD PRESSURE: 122 MMHG | BODY MASS INDEX: 29.38 KG/M2 | HEIGHT: 66 IN | OXYGEN SATURATION: 95 %

## 2024-02-29 DIAGNOSIS — C49.21: ICD-10-CM

## 2024-02-29 PROCEDURE — 3074F SYST BP LT 130 MM HG: CPT | Performed by: ORTHOPAEDIC SURGERY

## 2024-02-29 PROCEDURE — 99213 OFFICE O/P EST LOW 20 MIN: CPT | Performed by: ORTHOPAEDIC SURGERY

## 2024-02-29 PROCEDURE — 3078F DIAST BP <80 MM HG: CPT | Performed by: ORTHOPAEDIC SURGERY

## 2024-02-29 ASSESSMENT — ENCOUNTER SYMPTOMS
CHILLS: 0
SENSORY CHANGE: 1
WEAKNESS: 0
SHORTNESS OF BREATH: 0
FEVER: 0

## 2024-02-29 NOTE — PROGRESS NOTES
Diagnosis:  Right thigh leiomyosarcoma    Surgical Interventions:   Non oncologic resection outside surgeon 8/4/23  Wide resection right thigh tumor bed 11/15/23    Radiation:  Neoadjuvant 50 Gy    Chemotherapy:  NA    HPI: Debo returns for surveillance follow up today. She reports her wound has nearly completely healed. She has  been released from wound care. There is no drainage. She denies feeling any recurrent mass. She has some swelling in the right thigh compared to the left and has decreased sensation on the medial thigh that is stable. She is here to review the CT chest and MRI right femur.     Past Medical History:   Past Medical History:   Diagnosis Date    Cancer (HCC)     Leiomyosarcoma    Cataract     IOL OU    Leiomyosarcoma (HCC)     Rosacea        Past Surgical History:  Past Surgical History:   Procedure Laterality Date    WIDE EXCISION, LESION, LOWER EXTREMITY Right 11/15/2023    Procedure: WIDE RESECTION OF RIGHT THIGH SARCOMA, WOUND VAC;  Surgeon: Abdoulaye Sainz D.O.;  Location: SURGERY [facility];  Service: Orthopedics    TUMOR EXCISION WITH BIOPSY Right 08/04/2023    right thigh    ABDOMINAL SUBTOTAL HYSTERECTOMY      CATARACT EXTRACTION WITH IOL Bilateral     OTHER ORTHOPEDIC SURGERY      Bilateral Feet       Outpatient Encounter Medications as of 2/29/2024   Medication Sig Dispense Refill    cephALEXin (KEFLEX) 500 MG Cap Take 500 mg by mouth 4 times a day. FOR 7 DAYS      nicotine polacrilex (NICORETTE) 2 MG Gum Take 2 mg by mouth see administration instructions. Up to 8 times per day as needed.      bisacodyl EC (DULCOLAX) 5 MG Tablet Delayed Response Take 5 mg by mouth 1 time a day as needed for Constipation (typically 1 x week).      SSD 1 % Cream APPLY TOPICALLY TO RIGHT GROIN THREE TIMES DAILY      Apoaequorin (PREVAGEN PO) Take 1 Tablet by mouth every day.      Wheat Dextrin (BENEFIBER PO) Take 1 Capsule by mouth 2 times a day as needed.      alendronate (FOSAMAX) 70  MG Tab Take 70 mg by mouth every 7 days.       estradiol (VIVELLE DOT) 0.1 MG/24HR PATCH BIWEEKLY Place 1 Patch on the skin two times a week.      metronidazole (METROCREAM) 0.75 % cream Apply 1 Application topically every day.      Multiple Vitamins-Minerals (CENTRUM SILVER 50+WOMEN PO) Take 1 Tablet by mouth every day.      NON SPECIFIED Take 1 Tablet by mouth 1 time a day as needed (nerve pain). NERVIVE: RELIEFS NERVE ACHES, DISCOMFORT, AND WEAKNESS.  Ingredients: Alpha-Lipoic Acid, B Complex vitamins, tumeric, and ginger       No facility-administered encounter medications on file as of 2024.        Allergies:   No Known Allergies    Family History:   Family History   Problem Relation Age of Onset    Lung Cancer Sister        Social History:   Social History     Tobacco Use   Smoking Status Former    Current packs/day: 0.00    Types: Cigarettes    Quit date: 2023    Years since quittin.0   Smokeless Tobacco Never     Social History     Substance and Sexual Activity   Alcohol Use Not Currently     Social History     Substance and Sexual Activity   Drug Use Never     Social History     Socioeconomic History    Marital status:      Spouse name: Not on file    Number of children: Not on file    Years of education: Not on file    Highest education level: Not on file   Occupational History    Not on file   Tobacco Use    Smoking status: Former     Current packs/day: 0.00     Types: Cigarettes     Quit date: 2023     Years since quittin.0    Smokeless tobacco: Never   Vaping Use    Vaping Use: Never used   Substance and Sexual Activity    Alcohol use: Not Currently    Drug use: Never    Sexual activity: Not on file   Other Topics Concern    Not on file   Social History Narrative    Not on file     Social Determinants of Health     Financial Resource Strain: Not on file   Food Insecurity: Not on file   Transportation Needs: Not on file   Physical Activity: Not on file   Stress: Not on  "file   Social Connections: Not on file   Intimate Partner Violence: Not on file   Housing Stability: Not on file       Review of Systems:  Review of Systems   Constitutional:  Negative for chills and fever.   Respiratory:  Negative for shortness of breath.    Cardiovascular:  Negative for chest pain.   Neurological:  Positive for sensory change. Negative for weakness.        Decreased sensation in saphenous distribution of thigh       Physical Exam:  /78 (BP Location: Right arm, Patient Position: Sitting, BP Cuff Size: Small adult)   Pulse 86   Temp 36.6 °C (97.9 °F) (Temporal)   Ht 1.676 m (5' 6\")   SpO2 95%   BMI 29.38 kg/m²     Physical Exam  Constitutional:       General: She is not in acute distress.     Appearance: Normal appearance. She is not ill-appearing.   HENT:      Head: Normocephalic and atraumatic.   Pulmonary:      Effort: Pulmonary effort is normal. No respiratory distress.      Breath sounds: No stridor. No wheezing.   Abdominal:      General: There is no distension.   Musculoskeletal:      Cervical back: Normal range of motion and neck supple.      Comments: RLE: Healed surgical wound over proximal medial thigh with stable surrounding radiation changes. The wound has sealed over and is dry. No surrounding erythema.  SILT spn, dpn, plantar and sural nerves. Decreased in saphenous distribution. Motor intact to ankle PF/DF.   Skin:     General: Skin is warm and dry.      Capillary Refill: Capillary refill takes less than 2 seconds.   Neurological:      General: No focal deficit present.      Mental Status: She is alert and oriented to person, place, and time.   Psychiatric:         Mood and Affect: Mood normal.         Behavior: Behavior normal.       Imaging:  MRI right femur with and without contrast from 2/22/24 demonstrates post surgical/post radiation changes with edema in the subcutaneous tissue and surrounding muscle. There is a small <2cm seroma. No evidence of tumor recurrence or " nodular enhancement.     CT chest w/o contrast 2/22/24 demonstrates stable 8 mm nodule in the right middle lobe. No new nodules or masses concerning for metastatic disease.         Assessment and Plan:  Right thigh high grade leiomyosarcoma s/p wide resection of tumor bed on 11/15/23  2.   Post-operative wound adjacent to surgical incision  - no evidence of local recurrence or pulmonary metastasis. She will remain on high grade surveillance with repeat right femur MRI and CT chest in 3 months. Her wound is essentially healed. She will continue to monitor for signs/symptoms of infection. She will follow up in 3 months after new surveillance imaging. All of her questions were answered and she verbalized understanding.     Referrals: None  Restrictions: None  New medications/refills: none  Imaging studies: MRI right femur w/wo, CT chest wo at 3 months (May 2024)  Follow-up: May 2024 for surveillance or sooner if concerns arise    Abdoulaye Sainz, DO  Orthopedics and Orthopedic Oncology

## 2024-04-15 DIAGNOSIS — C49.21: Primary | ICD-10-CM

## 2024-04-15 DIAGNOSIS — C49.9 LEIOMYOSARCOMA (HCC): ICD-10-CM

## 2024-04-18 ENCOUNTER — APPOINTMENT (RX ONLY)
Dept: URBAN - METROPOLITAN AREA CLINIC 6 | Facility: CLINIC | Age: 74
Setting detail: DERMATOLOGY
End: 2024-04-18

## 2024-04-18 DIAGNOSIS — L81.4 OTHER MELANIN HYPERPIGMENTATION: ICD-10-CM

## 2024-04-18 DIAGNOSIS — L82.1 OTHER SEBORRHEIC KERATOSIS: ICD-10-CM

## 2024-04-18 DIAGNOSIS — L57.0 ACTINIC KERATOSIS: ICD-10-CM

## 2024-04-18 DIAGNOSIS — L90.5 SCAR CONDITIONS AND FIBROSIS OF SKIN: ICD-10-CM

## 2024-04-18 DIAGNOSIS — L71.8 OTHER ROSACEA: ICD-10-CM

## 2024-04-18 DIAGNOSIS — D22 MELANOCYTIC NEVI: ICD-10-CM

## 2024-04-18 DIAGNOSIS — Z71.89 OTHER SPECIFIED COUNSELING: ICD-10-CM

## 2024-04-18 DIAGNOSIS — D18.0 HEMANGIOMA: ICD-10-CM

## 2024-04-18 PROBLEM — D18.01 HEMANGIOMA OF SKIN AND SUBCUTANEOUS TISSUE: Status: ACTIVE | Noted: 2024-04-18

## 2024-04-18 PROBLEM — D22.72 MELANOCYTIC NEVI OF LEFT LOWER LIMB, INCLUDING HIP: Status: ACTIVE | Noted: 2024-04-18

## 2024-04-18 PROBLEM — D22.61 MELANOCYTIC NEVI OF RIGHT UPPER LIMB, INCLUDING SHOULDER: Status: ACTIVE | Noted: 2024-04-18

## 2024-04-18 PROBLEM — D22.62 MELANOCYTIC NEVI OF LEFT UPPER LIMB, INCLUDING SHOULDER: Status: ACTIVE | Noted: 2024-04-18

## 2024-04-18 PROBLEM — D22.71 MELANOCYTIC NEVI OF RIGHT LOWER LIMB, INCLUDING HIP: Status: ACTIVE | Noted: 2024-04-18

## 2024-04-18 PROBLEM — D22.5 MELANOCYTIC NEVI OF TRUNK: Status: ACTIVE | Noted: 2024-04-18

## 2024-04-18 PROCEDURE — ? LIQUID NITROGEN

## 2024-04-18 PROCEDURE — ? COUNSELING

## 2024-04-18 PROCEDURE — ? PRESCRIPTION

## 2024-04-18 PROCEDURE — 99213 OFFICE O/P EST LOW 20 MIN: CPT | Mod: 25

## 2024-04-18 PROCEDURE — ? ADDITIONAL NOTES

## 2024-04-18 PROCEDURE — ? SUNSCREEN TREATMENT REGIMEN

## 2024-04-18 PROCEDURE — 17000 DESTRUCT PREMALG LESION: CPT

## 2024-04-18 PROCEDURE — 17003 DESTRUCT PREMALG LES 2-14: CPT

## 2024-04-18 RX ORDER — METRONIDAZOLE 7.5 MG/G
1 CREAM TOPICAL BID
Qty: 45 | Refills: 6 | Status: ERX

## 2024-04-18 ASSESSMENT — LOCATION DETAILED DESCRIPTION DERM
LOCATION DETAILED: LEFT INFERIOR MEDIAL FOREHEAD
LOCATION DETAILED: RIGHT CENTRAL BUCCAL CHEEK
LOCATION DETAILED: LEFT DISTAL POSTERIOR THIGH
LOCATION DETAILED: LEFT ANTERIOR PROXIMAL UPPER ARM
LOCATION DETAILED: RIGHT ANTERIOR PROXIMAL UPPER ARM
LOCATION DETAILED: RIGHT MEDIAL MALAR CHEEK
LOCATION DETAILED: LEFT MID PREAURICULAR CHEEK
LOCATION DETAILED: RIGHT SUPERIOR MEDIAL MIDBACK
LOCATION DETAILED: RIGHT PROXIMAL POSTERIOR UPPER ARM
LOCATION DETAILED: RIGHT CENTRAL TEMPLE
LOCATION DETAILED: EPIGASTRIC SKIN
LOCATION DETAILED: RIGHT VENTRAL LATERAL DISTAL FOREARM
LOCATION DETAILED: LEFT DISTAL POSTERIOR UPPER ARM
LOCATION DETAILED: LOWER STERNUM
LOCATION DETAILED: RIGHT INFERIOR MEDIAL FOREHEAD
LOCATION DETAILED: RIGHT ANTERIOR PROXIMAL THIGH
LOCATION DETAILED: LEFT ANTERIOR DISTAL THIGH
LOCATION DETAILED: LEFT INFERIOR CENTRAL MALAR CHEEK
LOCATION DETAILED: LEFT PROXIMAL POSTERIOR UPPER ARM
LOCATION DETAILED: RIGHT INFERIOR MEDIAL MALAR CHEEK
LOCATION DETAILED: LEFT MEDIAL UPPER BACK
LOCATION DETAILED: LEFT UPPER CUTANEOUS LIP
LOCATION DETAILED: INFERIOR THORACIC SPINE
LOCATION DETAILED: RIGHT DISTAL POSTERIOR THIGH
LOCATION DETAILED: LEFT VENTRAL PROXIMAL FOREARM

## 2024-04-18 ASSESSMENT — LOCATION SIMPLE DESCRIPTION DERM
LOCATION SIMPLE: LEFT POSTERIOR THIGH
LOCATION SIMPLE: LEFT POSTERIOR UPPER ARM
LOCATION SIMPLE: LEFT LIP
LOCATION SIMPLE: LEFT UPPER ARM
LOCATION SIMPLE: ABDOMEN
LOCATION SIMPLE: RIGHT POSTERIOR THIGH
LOCATION SIMPLE: LEFT THIGH
LOCATION SIMPLE: LEFT UPPER BACK
LOCATION SIMPLE: LEFT FOREHEAD
LOCATION SIMPLE: RIGHT FOREARM
LOCATION SIMPLE: RIGHT POSTERIOR UPPER ARM
LOCATION SIMPLE: LEFT CHEEK
LOCATION SIMPLE: RIGHT TEMPLE
LOCATION SIMPLE: RIGHT THIGH
LOCATION SIMPLE: RIGHT LOWER BACK
LOCATION SIMPLE: RIGHT UPPER ARM
LOCATION SIMPLE: RIGHT FOREHEAD
LOCATION SIMPLE: CHEST
LOCATION SIMPLE: UPPER BACK
LOCATION SIMPLE: LEFT FOREARM
LOCATION SIMPLE: RIGHT CHEEK

## 2024-04-18 ASSESSMENT — LOCATION ZONE DERM
LOCATION ZONE: FACE
LOCATION ZONE: TRUNK
LOCATION ZONE: LEG
LOCATION ZONE: LIP
LOCATION ZONE: ARM

## 2024-04-18 NOTE — PROCEDURE: ADDITIONAL NOTES
Render Risk Assessment In Note?: no
Detail Level: Detailed
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 Had lesion excised with WSG and had radiation with Dr. Conde. She is being followed by oncologist closely. Had a clear MRI and CT February 2024. No palpable lymphadenopathy. Will obtain records\\n\\nPrevious: 7cm firm, deep, and mobile nodule.\\nI recommend further imaging (MRI) or referral to a general surgeon for removal/biopsy.  She has an appointment with her PCP in 1 week, she wishes to address this lesion with her PCP. \\nKthea declined a lower body examination today, the lesion was palpated through her jeans, she declined an exam of the skin of the area at this time. \\nI emphasized importance of further evaluation. Explained that although lipomas may present this way,  liposarcomas or other potentially malignant processes can present this way as well. She has noticed the lesion growing over the past several months.

## 2024-05-07 ENCOUNTER — TELEPHONE (OUTPATIENT)
Dept: SURGICAL ONCOLOGY | Facility: MEDICAL CENTER | Age: 74
End: 2024-05-07
Payer: MEDICARE

## 2024-05-07 NOTE — TELEPHONE ENCOUNTER
This has been faxed to [phone]      ----- Message from Abdoulaye Sainz D.O. sent at 5/7/2024  1:37 PM PDT -----  Regarding: fax imaging orders  Please fax Debo's MRI and CT order to [facility]. We did previously, but they are saying they don't have it now and she can't schedule. Please call Debo when done and let her know it was sent.    Thanks,  Abdoulaye

## 2024-06-12 ENCOUNTER — HOSPITAL ENCOUNTER (OUTPATIENT)
Dept: RADIOLOGY | Facility: MEDICAL CENTER | Age: 74
End: 2024-06-12
Attending: ORTHOPAEDIC SURGERY
Payer: MEDICARE

## 2024-06-13 ENCOUNTER — OFFICE VISIT (OUTPATIENT)
Dept: SURGICAL ONCOLOGY | Facility: MEDICAL CENTER | Age: 74
End: 2024-06-13
Payer: MEDICARE

## 2024-06-13 VITALS
OXYGEN SATURATION: 93 % | SYSTOLIC BLOOD PRESSURE: 128 MMHG | HEIGHT: 66 IN | DIASTOLIC BLOOD PRESSURE: 68 MMHG | WEIGHT: 189 LBS | BODY MASS INDEX: 30.37 KG/M2 | TEMPERATURE: 97.6 F | HEART RATE: 74 BPM

## 2024-06-13 DIAGNOSIS — C49.21: Primary | ICD-10-CM

## 2024-06-13 PROCEDURE — 99213 OFFICE O/P EST LOW 20 MIN: CPT | Performed by: ORTHOPAEDIC SURGERY

## 2024-06-13 PROCEDURE — 3078F DIAST BP <80 MM HG: CPT | Performed by: ORTHOPAEDIC SURGERY

## 2024-06-13 PROCEDURE — 3074F SYST BP LT 130 MM HG: CPT | Performed by: ORTHOPAEDIC SURGERY

## 2024-06-13 ASSESSMENT — ENCOUNTER SYMPTOMS
CHILLS: 0
WEAKNESS: 0
SHORTNESS OF BREATH: 0
SENSORY CHANGE: 1
FEVER: 0

## 2024-06-13 ASSESSMENT — FIBROSIS 4 INDEX: FIB4 SCORE: 1.33

## 2024-06-13 NOTE — PROGRESS NOTES
Diagnosis:  Right thigh leiomyosarcoma    Surgical Interventions:   Non oncologic resection outside surgeon 8/4/23  Wide resection right thigh tumor bed 11/15/23    Radiation:  Neoadjuvant 50 Gy    Chemotherapy:  NA    HPI: Debo returns for surveillance follow up today. She reports her wound has nearly completely healed. She has  been released from wound care. There is no drainage. She denies feeling any recurrent mass. She has some swelling in the right thigh compared to the left and has decreased sensation on the medial thigh that is stable. She is here to review the CT chest and MRI right femur.     Past Medical History:   Past Medical History:   Diagnosis Date    Cancer (HCC)     Leiomyosarcoma    Cataract     IOL OU    Leiomyosarcoma (HCC)     Rosacea        Past Surgical History:  Past Surgical History:   Procedure Laterality Date    WIDE EXCISION, LESION, LOWER EXTREMITY Right 11/15/2023    Procedure: WIDE RESECTION OF RIGHT THIGH SARCOMA, WOUND VAC;  Surgeon: Abdoulaye Sainz D.O.;  Location: SURGERY [facility];  Service: Orthopedics    TUMOR EXCISION WITH BIOPSY Right 08/04/2023    right thigh    ABDOMINAL SUBTOTAL HYSTERECTOMY      CATARACT EXTRACTION WITH IOL Bilateral     OTHER ORTHOPEDIC SURGERY      Bilateral Feet       Outpatient Encounter Medications as of 6/13/2024   Medication Sig Dispense Refill    cephALEXin (KEFLEX) 500 MG Cap Take 500 mg by mouth 4 times a day. FOR 7 DAYS      nicotine polacrilex (NICORETTE) 2 MG Gum Take 2 mg by mouth see administration instructions. Up to 8 times per day as needed.      bisacodyl EC (DULCOLAX) 5 MG Tablet Delayed Response Take 5 mg by mouth 1 time a day as needed for Constipation (typically 1 x week).      SSD 1 % Cream APPLY TOPICALLY TO RIGHT GROIN THREE TIMES DAILY      Apoaequorin (PREVAGEN PO) Take 1 Tablet by mouth every day.      Wheat Dextrin (BENEFIBER PO) Take 1 Capsule by mouth 2 times a day as needed.      alendronate (FOSAMAX) 70  MG Tab Take 70 mg by mouth every 7 days.       estradiol (VIVELLE DOT) 0.1 MG/24HR PATCH BIWEEKLY Place 1 Patch on the skin two times a week.      metronidazole (METROCREAM) 0.75 % cream Apply 1 Application topically every day.      Multiple Vitamins-Minerals (CENTRUM SILVER 50+WOMEN PO) Take 1 Tablet by mouth every day.      NON SPECIFIED Take 1 Tablet by mouth 1 time a day as needed (nerve pain). NERVIVE: RELIEFS NERVE ACHES, DISCOMFORT, AND WEAKNESS.  Ingredients: Alpha-Lipoic Acid, B Complex vitamins, tumeric, and ginger       No facility-administered encounter medications on file as of 2024.        Allergies:   No Known Allergies    Family History:   Family History   Problem Relation Age of Onset    Lung Cancer Sister        Social History:   Social History     Tobacco Use   Smoking Status Former    Current packs/day: 0.00    Types: Cigarettes    Quit date: 2023    Years since quittin.3   Smokeless Tobacco Never     Social History     Substance and Sexual Activity   Alcohol Use Not Currently     Social History     Substance and Sexual Activity   Drug Use Never     Social History     Socioeconomic History    Marital status:      Spouse name: Not on file    Number of children: Not on file    Years of education: Not on file    Highest education level: Not on file   Occupational History    Not on file   Tobacco Use    Smoking status: Former     Current packs/day: 0.00     Types: Cigarettes     Quit date: 2023     Years since quittin.3    Smokeless tobacco: Never   Vaping Use    Vaping status: Never Used   Substance and Sexual Activity    Alcohol use: Not Currently    Drug use: Never    Sexual activity: Not on file   Other Topics Concern    Not on file   Social History Narrative    Not on file     Social Determinants of Health     Financial Resource Strain: Not on file   Food Insecurity: Not on file   Transportation Needs: Not on file   Physical Activity: Not on file   Stress: Not  "on file   Social Connections: Not on file   Intimate Partner Violence: Not on file   Housing Stability: Not on file       Review of Systems:  Review of Systems   Constitutional:  Negative for chills and fever.   Respiratory:  Negative for shortness of breath.    Cardiovascular:  Negative for chest pain.   Neurological:  Positive for sensory change. Negative for weakness.        Decreased sensation in saphenous distribution of thigh (unchanged)       Physical Exam:  /68 (BP Location: Right arm, Patient Position: Sitting)   Pulse 74   Temp 36.4 °C (97.6 °F) (Temporal)   Ht 1.676 m (5' 6\")   Wt 85.7 kg (189 lb)   SpO2 93%   BMI 30.51 kg/m²     Physical Exam  Constitutional:       General: She is not in acute distress.     Appearance: Normal appearance. She is not ill-appearing.   HENT:      Head: Normocephalic and atraumatic.   Pulmonary:      Effort: Pulmonary effort is normal. No respiratory distress.      Breath sounds: No stridor. No wheezing.   Abdominal:      General: There is no distension.   Musculoskeletal:      Cervical back: Normal range of motion and neck supple.      Comments: RLE: Healed surgical wound over proximal medial thigh with stable surrounding radiation changes. No surrounding erythema.  SILT spn, dpn, plantar and sural nerves. Decreased in saphenous distribution. Motor intact to ankle PF/DF. No palpable masses in resection bed or inguinal lymphadenopathy   Skin:     General: Skin is warm and dry.      Capillary Refill: Capillary refill takes less than 2 seconds.   Neurological:      General: No focal deficit present.      Mental Status: She is alert and oriented to person, place, and time.   Psychiatric:         Mood and Affect: Mood normal.         Behavior: Behavior normal.       Imaging:  MRI right femur with and without contrast from 6/7/24 demonstrates post surgical/post radiation changes with edema in the subcutaneous tissue and surrounding muscle. There is a small <2cm " resolving seroma. No evidence of tumor recurrence or nodular enhancement.     CT chest w/o contrast 6/7/24 demonstrates stable 8 mm nodule in the right middle lobe. No new nodules or masses concerning for metastatic disease.         Assessment and Plan:  Right thigh high grade leiomyosarcoma s/p wide resection of tumor bed on 11/15/23    - no evidence of local recurrence or pulmonary metastasis. She will remain on high grade surveillance with repeat right femur MRI and CT chest in 3 months. She will follow up in 3 months after new surveillance imaging. All of her questions were answered and she verbalized understanding.     Referrals: None  Restrictions: None  New medications/refills: none  Imaging studies: MRI right femur w/wo, CT chest wo at 3 months (September 2024)  Follow-up: September 2024 for surveillance or sooner if concerns arise    Abdoulaye Sainz DO  Orthopedics and Orthopedic Oncology

## 2024-09-17 ENCOUNTER — HOSPITAL ENCOUNTER (OUTPATIENT)
Dept: RADIOLOGY | Facility: MEDICAL CENTER | Age: 74
End: 2024-09-17
Attending: ORTHOPAEDIC SURGERY
Payer: MEDICARE

## 2024-10-10 ENCOUNTER — APPOINTMENT (OUTPATIENT)
Dept: SURGICAL ONCOLOGY | Facility: MEDICAL CENTER | Age: 74
End: 2024-10-10
Payer: MEDICARE

## 2024-10-10 ENCOUNTER — DOCUMENTATION (OUTPATIENT)
Dept: SURGICAL ONCOLOGY | Facility: MEDICAL CENTER | Age: 74
End: 2024-10-10

## 2024-10-10 VITALS
DIASTOLIC BLOOD PRESSURE: 64 MMHG | OXYGEN SATURATION: 94 % | WEIGHT: 200 LBS | SYSTOLIC BLOOD PRESSURE: 140 MMHG | TEMPERATURE: 98.2 F | BODY MASS INDEX: 32.14 KG/M2 | HEIGHT: 66 IN | HEART RATE: 79 BPM

## 2024-10-10 DIAGNOSIS — C49.21: ICD-10-CM

## 2024-10-10 PROCEDURE — 99214 OFFICE O/P EST MOD 30 MIN: CPT | Performed by: ORTHOPAEDIC SURGERY

## 2024-10-10 PROCEDURE — 3078F DIAST BP <80 MM HG: CPT | Performed by: ORTHOPAEDIC SURGERY

## 2024-10-10 PROCEDURE — 3077F SYST BP >= 140 MM HG: CPT | Performed by: ORTHOPAEDIC SURGERY

## 2024-10-10 ASSESSMENT — ENCOUNTER SYMPTOMS
SHORTNESS OF BREATH: 0
CHILLS: 0
WEAKNESS: 0
FEVER: 0
SENSORY CHANGE: 1

## 2024-10-10 ASSESSMENT — FIBROSIS 4 INDEX: FIB4 SCORE: 1.35

## 2024-10-24 ENCOUNTER — APPOINTMENT (RX ONLY)
Dept: URBAN - METROPOLITAN AREA CLINIC 6 | Facility: CLINIC | Age: 74
Setting detail: DERMATOLOGY
End: 2024-10-24

## 2024-10-24 DIAGNOSIS — L71.8 OTHER ROSACEA: ICD-10-CM

## 2024-10-24 DIAGNOSIS — L57.0 ACTINIC KERATOSIS: ICD-10-CM

## 2024-10-24 PROCEDURE — ? COUNSELING

## 2024-10-24 PROCEDURE — ? ADDITIONAL NOTES

## 2024-10-24 PROCEDURE — 17003 DESTRUCT PREMALG LES 2-14: CPT

## 2024-10-24 PROCEDURE — 17000 DESTRUCT PREMALG LESION: CPT

## 2024-10-24 PROCEDURE — 99212 OFFICE O/P EST SF 10 MIN: CPT | Mod: 25

## 2024-10-24 PROCEDURE — ? LIQUID NITROGEN

## 2024-10-24 ASSESSMENT — LOCATION SIMPLE DESCRIPTION DERM
LOCATION SIMPLE: RIGHT CHEEK
LOCATION SIMPLE: LEFT CHEEK
LOCATION SIMPLE: LEFT FOREHEAD
LOCATION SIMPLE: LEFT LIP

## 2024-10-24 ASSESSMENT — LOCATION DETAILED DESCRIPTION DERM
LOCATION DETAILED: LEFT INFERIOR CENTRAL MALAR CHEEK
LOCATION DETAILED: RIGHT INFERIOR MEDIAL MALAR CHEEK
LOCATION DETAILED: RIGHT CENTRAL BUCCAL CHEEK
LOCATION DETAILED: LEFT UPPER CUTANEOUS LIP
LOCATION DETAILED: LEFT INFERIOR FOREHEAD

## 2024-10-24 ASSESSMENT — LOCATION ZONE DERM
LOCATION ZONE: LIP
LOCATION ZONE: FACE

## 2024-10-24 NOTE — PROCEDURE: ADDITIONAL NOTES
Additional Notes: Improved on metronidazole, will continue
Detail Level: Simple
Render Risk Assessment In Note?: no

## 2025-01-13 ENCOUNTER — HOSPITAL ENCOUNTER (OUTPATIENT)
Dept: RADIOLOGY | Facility: MEDICAL CENTER | Age: 75
End: 2025-01-13
Attending: ORTHOPAEDIC SURGERY
Payer: MEDICARE

## 2025-01-16 ENCOUNTER — OFFICE VISIT (OUTPATIENT)
Dept: SURGICAL ONCOLOGY | Facility: MEDICAL CENTER | Age: 75
End: 2025-01-16
Payer: MEDICARE

## 2025-01-16 ENCOUNTER — TELEPHONE (OUTPATIENT)
Dept: SURGICAL ONCOLOGY | Facility: MEDICAL CENTER | Age: 75
End: 2025-01-16

## 2025-01-16 VITALS
SYSTOLIC BLOOD PRESSURE: 138 MMHG | HEART RATE: 86 BPM | OXYGEN SATURATION: 96 % | BODY MASS INDEX: 33.89 KG/M2 | WEIGHT: 210.9 LBS | TEMPERATURE: 97.7 F | HEIGHT: 66 IN | DIASTOLIC BLOOD PRESSURE: 72 MMHG

## 2025-01-16 DIAGNOSIS — C49.21: ICD-10-CM

## 2025-01-16 PROCEDURE — 3075F SYST BP GE 130 - 139MM HG: CPT | Performed by: ORTHOPAEDIC SURGERY

## 2025-01-16 PROCEDURE — 3078F DIAST BP <80 MM HG: CPT | Performed by: ORTHOPAEDIC SURGERY

## 2025-01-16 PROCEDURE — 99213 OFFICE O/P EST LOW 20 MIN: CPT | Performed by: ORTHOPAEDIC SURGERY

## 2025-01-16 ASSESSMENT — ENCOUNTER SYMPTOMS
SENSORY CHANGE: 1
WEAKNESS: 0
SHORTNESS OF BREATH: 0
CHILLS: 0
FEVER: 0

## 2025-01-16 ASSESSMENT — FIBROSIS 4 INDEX: FIB4 SCORE: 1.35

## 2025-01-17 NOTE — TELEPHONE ENCOUNTER
----- Message from Physician Abdoulaye Sainz D.O. sent at 1/16/2025  4:16 PM PST -----  Regarding: fax orders  Please fax Debo's mri and CT orders to [facility].    Thanks,  Abdoulaye  -------    These have been faxed to [facility] at fax #  [phone]

## 2025-01-17 NOTE — PROGRESS NOTES
Diagnosis:  Right thigh leiomyosarcoma    Surgical Interventions:   Non oncologic resection outside surgeon 8/4/23  Wide resection right thigh tumor bed 11/15/23    Radiation:  Neoadjuvant 50 Gy    Chemotherapy:  NA    HPI: Debo returns for surveillance follow up today. She is doing well.  She has not felt any recurrent mass in the resection bed.  She reports some weight gain, but otherwise in normal state of health.     Past Medical History:   Past Medical History:   Diagnosis Date    Cancer (HCC)     Leiomyosarcoma    Cataract     IOL OU    Leiomyosarcoma (HCC)     Rosacea        Past Surgical History:  Past Surgical History:   Procedure Laterality Date    WIDE EXCISION, LESION, LOWER EXTREMITY Right 11/15/2023    Procedure: WIDE RESECTION OF RIGHT THIGH SARCOMA, WOUND VAC;  Surgeon: Abdoulaye Sainz D.O.;  Location: SURGERY [facility];  Service: Orthopedics    TUMOR EXCISION WITH BIOPSY Right 08/04/2023    right thigh    ABDOMINAL SUBTOTAL HYSTERECTOMY      CATARACT EXTRACTION WITH IOL Bilateral     OTHER ORTHOPEDIC SURGERY      Bilateral Feet       Outpatient Encounter Medications as of 1/16/2025   Medication Sig Dispense Refill    nicotine polacrilex (NICORETTE) 2 MG Gum Take 2 mg by mouth see administration instructions. Up to 8 times per day as needed.      Apoaequorin (PREVAGEN PO) Take 1 Tablet by mouth every day.      Wheat Dextrin (BENEFIBER PO) Take 1 Capsule by mouth 2 times a day as needed.      alendronate (FOSAMAX) 70 MG Tab Take 70 mg by mouth every 7 days. MONDAYS      estradiol (VIVELLE DOT) 0.1 MG/24HR PATCH BIWEEKLY Place 1 Patch on the skin two times a week.      metronidazole (METROCREAM) 0.75 % cream Apply 1 Application topically every day.      Multiple Vitamins-Minerals (CENTRUM SILVER 50+WOMEN PO) Take 1 Tablet by mouth every day.      cephALEXin (KEFLEX) 500 MG Cap Take 500 mg by mouth 4 times a day. FOR 7 DAYS (Patient not taking: Reported on 1/16/2025)      bisacodyl EC  (DULCOLAX) 5 MG Tablet Delayed Response Take 5 mg by mouth 1 time a day as needed for Constipation (typically 1 x week). (Patient not taking: Reported on 2025)      SSD 1 % Cream APPLY TOPICALLY TO RIGHT GROIN THREE TIMES DAILY (Patient not taking: Reported on 2025)      NON SPECIFIED Take 1 Tablet by mouth 1 time a day as needed (nerve pain). NERVIVE: RELIEFS NERVE ACHES, DISCOMFORT, AND WEAKNESS.  Ingredients: Alpha-Lipoic Acid, B Complex vitamins, tumeric, and ginger       No facility-administered encounter medications on file as of 2025.        Allergies:   No Known Allergies    Family History:   Family History   Problem Relation Age of Onset    Lung Cancer Sister        Social History:   Social History     Tobacco Use   Smoking Status Former    Current packs/day: 0.00    Types: Cigarettes    Quit date: 2023    Years since quittin.9   Smokeless Tobacco Never     Social History     Substance and Sexual Activity   Alcohol Use Not Currently     Social History     Substance and Sexual Activity   Drug Use Never     Social History     Socioeconomic History    Marital status:      Spouse name: Not on file    Number of children: Not on file    Years of education: Not on file    Highest education level: Not on file   Occupational History    Not on file   Tobacco Use    Smoking status: Former     Current packs/day: 0.00     Types: Cigarettes     Quit date: 2023     Years since quittin.9    Smokeless tobacco: Never   Vaping Use    Vaping status: Never Used   Substance and Sexual Activity    Alcohol use: Not Currently    Drug use: Never    Sexual activity: Not on file   Other Topics Concern    Not on file   Social History Narrative    Not on file     Social Drivers of Health     Financial Resource Strain: Not on file   Food Insecurity: Not on file   Transportation Needs: Not on file   Physical Activity: Not on file   Stress: Not on file   Social Connections: Not on file   Intimate  "Partner Violence: Not on file   Housing Stability: Not on file       Review of Systems:  Review of Systems   Constitutional:  Negative for chills and fever.   Respiratory:  Negative for shortness of breath.    Cardiovascular:  Negative for chest pain.   Neurological:  Positive for sensory change. Negative for weakness.        Decreased sensation in saphenous distribution of thigh (unchanged)       Physical Exam:  /72 (BP Location: Left arm, Patient Position: Sitting, BP Cuff Size: Large adult)   Pulse 86   Temp 36.5 °C (97.7 °F) (Temporal)   Ht 1.676 m (5' 6\")   Wt 95.7 kg (210 lb 14.4 oz)   SpO2 96%   BMI 34.04 kg/m²     Physical Exam  Constitutional:       General: She is not in acute distress.     Appearance: Normal appearance. She is not ill-appearing.   HENT:      Head: Normocephalic and atraumatic.   Pulmonary:      Effort: Pulmonary effort is normal. No respiratory distress.   Musculoskeletal:      Cervical back: Normal range of motion and neck supple.      Comments: RLE: Healed surgical wound over proximal medial thigh with stable surrounding radiation changes.   SILT spn, dpn, plantar and sural nerves. Decreased in saphenous distribution. Motor intact to ankle PF/DF. No palpable masses in resection bed or inguinal lymphadenopathy   Skin:     General: Skin is warm and dry.      Capillary Refill: Capillary refill takes less than 2 seconds.   Neurological:      General: No focal deficit present.      Mental Status: She is alert and oriented to person, place, and time.   Psychiatric:         Mood and Affect: Mood normal.         Behavior: Behavior normal.       Imaging:  MRI right femur with and without contrast from December 2024 demonstrates post surgical/post radiation changes and scar.  No evidence of tumor recurrence or nodular enhancement.     CT chest w/o contrast December 2024 demonstrates 3 mm nodule in right upper lobe, indeterminate. No masses concerning for metastatic disease. "         Assessment and Plan:  Right thigh high grade leiomyosarcoma s/p wide resection of tumor bed on 11/15/23    - no evidence of local recurrence or pulmonary metastasis. She will remain on high grade surveillance with repeat right femur MRI and CT chest in 3 months. She will follow up in 3 months after new surveillance imaging. All of her questions were answered and she verbalized understanding.     Referrals: None  Restrictions: None  New medications/refills: none  Imaging studies: MRI right femur w/wo, CT chest wo at 3 months (March 2025)  Follow-up: March 2025    Abdoulaye Sainz DO  Orthopedics and Orthopedic Oncology

## 2025-03-18 ENCOUNTER — TELEPHONE (OUTPATIENT)
Facility: MEDICAL CENTER | Age: 75
End: 2025-03-18
Payer: MEDICARE

## 2025-03-18 NOTE — TELEPHONE ENCOUNTER
----- Message from Physician Abdoulaye Sainz D.O. sent at 3/17/2025 12:50 PM PDT -----  Regarding: follow up  Please call and schedule Debo for follow up and also request her most recent MRI and CT.    Thanks,  Abdoulaye

## 2025-03-18 NOTE — TELEPHONE ENCOUNTER
Called and left  for patient to schedule.     Patient has prominence, I have faxed the PA and it has .

## 2025-03-25 ENCOUNTER — HOSPITAL ENCOUNTER (OUTPATIENT)
Dept: RADIOLOGY | Facility: MEDICAL CENTER | Age: 75
End: 2025-03-25
Attending: ORTHOPAEDIC SURGERY
Payer: MEDICARE

## 2025-04-08 RX ORDER — METRONIDAZOLE 7.5 MG/G
1 CREAM TOPICAL BID
Qty: 45 | Refills: 6 | Status: ERX

## 2025-04-24 ENCOUNTER — TELEPHONE (OUTPATIENT)
Facility: MEDICAL CENTER | Age: 75
End: 2025-04-24

## 2025-04-24 ENCOUNTER — OFFICE VISIT (OUTPATIENT)
Facility: MEDICAL CENTER | Age: 75
End: 2025-04-24
Payer: MEDICARE

## 2025-04-24 VITALS
HEART RATE: 74 BPM | BODY MASS INDEX: 34.37 KG/M2 | TEMPERATURE: 97.3 F | WEIGHT: 213.85 LBS | SYSTOLIC BLOOD PRESSURE: 140 MMHG | DIASTOLIC BLOOD PRESSURE: 80 MMHG | HEIGHT: 66 IN | OXYGEN SATURATION: 95 %

## 2025-04-24 DIAGNOSIS — C49.21: ICD-10-CM

## 2025-04-24 DIAGNOSIS — C49.9 LEIOMYOSARCOMA (HCC): ICD-10-CM

## 2025-04-24 PROCEDURE — 3079F DIAST BP 80-89 MM HG: CPT | Performed by: ORTHOPAEDIC SURGERY

## 2025-04-24 PROCEDURE — 3077F SYST BP >= 140 MM HG: CPT | Performed by: ORTHOPAEDIC SURGERY

## 2025-04-24 PROCEDURE — 99213 OFFICE O/P EST LOW 20 MIN: CPT | Performed by: ORTHOPAEDIC SURGERY

## 2025-04-24 ASSESSMENT — ENCOUNTER SYMPTOMS
FEVER: 0
CHILLS: 0
SENSORY CHANGE: 1
WEAKNESS: 0
SHORTNESS OF BREATH: 0

## 2025-04-24 ASSESSMENT — FIBROSIS 4 INDEX: FIB4 SCORE: 1.35

## 2025-04-24 NOTE — TELEPHONE ENCOUNTER
----- Message from Physician Abdoulaye Sainz D.O. sent at 4/24/2025 11:35 AM PDT -----  Regarding: surveillance  Please fax Debo's new CT and MRI orders to [facility] and arrange follow up with me in June for after her scans.     Thanks,  Abdoulaye

## 2025-04-24 NOTE — TELEPHONE ENCOUNTER
Phone Number Called: [phone]    Message: Pt informed. She will call back once scans are scheduled.

## 2025-04-24 NOTE — PROGRESS NOTES
Diagnosis:  Right thigh leiomyosarcoma    Surgical Interventions:   Non oncologic resection outside surgeon 8/4/23  Wide resection right thigh tumor bed 11/15/23    Radiation:  Neoadjuvant 50 Gy    Chemotherapy:  NA    HPI: Debo returns for surveillance follow up today. She is doing well.  She has not felt any recurrent mass in the resection bed.  She is excited about a planned vacation next month where she will be going to [place], [place], [place] and [place].  She otherwise denies any new changes or medical problems.    Past Medical History:   Past Medical History:   Diagnosis Date    Cancer (HCC)     Leiomyosarcoma    Cataract     IOL OU    Leiomyosarcoma (HCC)     Rosacea        Past Surgical History:  Past Surgical History:   Procedure Laterality Date    WIDE EXCISION, LESION, LOWER EXTREMITY Right 11/15/2023    Procedure: WIDE RESECTION OF RIGHT THIGH SARCOMA, WOUND VAC;  Surgeon: Abdoulaye Sainz D.O.;  Location: SURGERY [facility];  Service: Orthopedics    TUMOR EXCISION WITH BIOPSY Right 08/04/2023    right thigh    ABDOMINAL SUBTOTAL HYSTERECTOMY      CATARACT EXTRACTION WITH IOL Bilateral     OTHER ORTHOPEDIC SURGERY      Bilateral Feet       Outpatient Encounter Medications as of 4/24/2025   Medication Sig Dispense Refill    cephALEXin (KEFLEX) 500 MG Cap Take 500 mg by mouth 4 times a day. FOR 7 DAYS      nicotine polacrilex (NICORETTE) 2 MG Gum Take 2 mg by mouth see administration instructions. Up to 8 times per day as needed.      bisacodyl EC (DULCOLAX) 5 MG Tablet Delayed Response Take 5 mg by mouth 1 time a day as needed for Constipation (typically 1 x week).      SSD 1 % Cream       Apoaequorin (PREVAGEN PO) Take 1 Tablet by mouth every day.      Wheat Dextrin (BENEFIBER PO) Take 1 Capsule by mouth 2 times a day as needed.      estradiol (VIVELLE DOT) 0.1 MG/24HR PATCH BIWEEKLY Place 1 Patch on the skin two times a week.      metronidazole (METROCREAM) 0.75 % cream  Apply 1 Application topically every day.      Multiple Vitamins-Minerals (CENTRUM SILVER 50+WOMEN PO) Take 1 Tablet by mouth every day.      NON SPECIFIED Take 1 Tablet by mouth 1 time a day as needed (nerve pain). NERVIVE: RELIEFS NERVE ACHES, DISCOMFORT, AND WEAKNESS.  Ingredients: Alpha-Lipoic Acid, B Complex vitamins, tumeric, and ginger      alendronate (FOSAMAX) 70 MG Tab Take 70 mg by mouth every 7 days.  (Patient not taking: Reported on 2025)       No facility-administered encounter medications on file as of 2025.        Allergies:   No Known Allergies    Family History:   Family History   Problem Relation Age of Onset    Lung Cancer Sister        Social History:   Social History     Tobacco Use   Smoking Status Former    Current packs/day: 0.00    Types: Cigarettes    Quit date: 2023    Years since quittin.2   Smokeless Tobacco Never     Social History     Substance and Sexual Activity   Alcohol Use Not Currently     Social History     Substance and Sexual Activity   Drug Use Never     Social History     Socioeconomic History    Marital status:      Spouse name: Not on file    Number of children: Not on file    Years of education: Not on file    Highest education level: Not on file   Occupational History    Not on file   Tobacco Use    Smoking status: Former     Current packs/day: 0.00     Types: Cigarettes     Quit date: 2023     Years since quittin.2    Smokeless tobacco: Never   Vaping Use    Vaping status: Never Used   Substance and Sexual Activity    Alcohol use: Not Currently    Drug use: Never    Sexual activity: Not on file   Other Topics Concern    Not on file   Social History Narrative    Not on file     Social Drivers of Health     Financial Resource Strain: Not on file   Food Insecurity: Not on file   Transportation Needs: Not on file   Physical Activity: Not on file   Stress: Not on file   Social Connections: Not on file   Intimate Partner Violence: Not  "on file   Housing Stability: Not on file       Review of Systems:  Review of Systems   Constitutional:  Negative for chills and fever.   Respiratory:  Negative for shortness of breath.    Cardiovascular:  Negative for chest pain.   Neurological:  Positive for sensory change. Negative for weakness.        Decreased sensation in saphenous distribution of thigh (unchanged)       Physical Exam:  BP (!) 140/80 (BP Location: Right arm, Patient Position: Sitting, BP Cuff Size: Large adult)   Pulse 74   Temp 36.3 °C (97.3 °F) (Temporal)   Ht 1.676 m (5' 6\")   Wt 97 kg (213 lb 13.5 oz)   SpO2 95%   BMI 34.52 kg/m²     Physical Exam  Constitutional:       General: She is not in acute distress.     Appearance: Normal appearance. She is not ill-appearing.   HENT:      Head: Normocephalic and atraumatic.   Pulmonary:      Effort: Pulmonary effort is normal. No respiratory distress.   Musculoskeletal:      Cervical back: Normal range of motion and neck supple.      Comments: RLE: Healed surgical wound over proximal medial thigh with stable surrounding radiation changes.   SILT spn, dpn, plantar and sural nerves. Decreased in saphenous distribution. Motor intact to ankle PF/DF. No palpable masses in resection bed or inguinal lymphadenopathy   Skin:     General: Skin is warm and dry.      Capillary Refill: Capillary refill takes less than 2 seconds.   Neurological:      General: No focal deficit present.      Mental Status: She is alert and oriented to person, place, and time.   Psychiatric:         Mood and Affect: Mood normal.         Behavior: Behavior normal.     Imaging:  MRI right femur with and without contrast from March 2025 demonstrates post surgical/post radiation changes and scar.  No evidence of tumor recurrence or nodular enhancement.     CT chest w/o contrast March 2025 multiple small pulmonary nodules.  Per the radiology report there are several there is a 3 mm or less which I cannot identify when I reviewed.  " They do report a nodule that is 9 x 8 mm in the right middle lobe.  On my read I am not sure if that is a nodule or just part of a branching blood vessel.  When I looked at her previous CT it looks similar to me.      Assessment and Plan:  Right thigh high grade leiomyosarcoma s/p wide resection of tumor bed on 11/15/23    - I counseled Deysi regarding the above clinical and imaging findings.  We discussed that the pulmonary nodules are indeterminate.  Several of them are too small that I cannot even identify them today.  The largest 1 per the report looks similar to me compared to her prior CT scan.  I am not sure if it is even a nodule or just part of the branching vessel.  Regardless it is less than a centimeter and I recommended continuing with surveillance at this point.  Discussed that the risk of a nondiagnostic biopsy is high with nodules that are smaller than 1 cm.  All of her questions were answered and she was in agreement with the plan.  We will continue with high-grade surveillance.  Her next imaging will be ordered for June 2025.    Referrals: None  Restrictions: None  New medications/refills: none  Imaging studies: MRI right femur w/wo, CT chest wo at 3 months (June 2025)  Follow-up: June 2025    Abdoulaye Sainz DO  Orthopedics and Orthopedic Oncology

## 2025-05-14 NOTE — TELEPHONE ENCOUNTER
Phone Number Called: [phone] (home)     Call outcome: Did not leave a detailed message. Requested patient to call back.

## 2025-05-15 NOTE — TELEPHONE ENCOUNTER
Phone Number Called: [phone] (home)      Call outcome: Did not leave a detailed message. Requested patient to call back. Would like to confirm if patient has scans scheduled.

## 2025-06-19 ENCOUNTER — HOSPITAL ENCOUNTER (OUTPATIENT)
Dept: RADIOLOGY | Facility: MEDICAL CENTER | Age: 75
End: 2025-06-19
Attending: ORTHOPAEDIC SURGERY

## 2025-06-19 ENCOUNTER — APPOINTMENT (OUTPATIENT)
Facility: MEDICAL CENTER | Age: 75
End: 2025-06-19
Payer: MEDICARE

## 2025-06-19 VITALS
OXYGEN SATURATION: 96 % | WEIGHT: 213 LBS | SYSTOLIC BLOOD PRESSURE: 148 MMHG | HEIGHT: 66 IN | HEART RATE: 70 BPM | DIASTOLIC BLOOD PRESSURE: 74 MMHG | BODY MASS INDEX: 34.23 KG/M2 | TEMPERATURE: 98.7 F

## 2025-06-19 DIAGNOSIS — C49.21: Primary | ICD-10-CM

## 2025-06-19 PROCEDURE — 99213 OFFICE O/P EST LOW 20 MIN: CPT | Performed by: ORTHOPAEDIC SURGERY

## 2025-06-19 PROCEDURE — 3078F DIAST BP <80 MM HG: CPT | Performed by: ORTHOPAEDIC SURGERY

## 2025-06-19 PROCEDURE — 3077F SYST BP >= 140 MM HG: CPT | Performed by: ORTHOPAEDIC SURGERY

## 2025-06-19 ASSESSMENT — ENCOUNTER SYMPTOMS
SHORTNESS OF BREATH: 0
CHILLS: 0
FEVER: 0
WEAKNESS: 0
SENSORY CHANGE: 1

## 2025-06-19 ASSESSMENT — FIBROSIS 4 INDEX: FIB4 SCORE: 1.35

## 2025-06-19 NOTE — PROGRESS NOTES
Diagnosis:  Right thigh leiomyosarcoma    Surgical Interventions:   Non oncologic resection outside surgeon 8/4/23  Wide resection right thigh tumor bed 11/15/23    Radiation:  Neoadjuvant 50 Gy    Chemotherapy:  NA    HPI: Debo returns for surveillance follow up today. She is doing well.  She has not felt any recurrent mass in the resection bed.  She went on vacation and got back a couple of weeks ago.    Past Medical History:   Past Medical History:   Diagnosis Date    Cancer (HCC)     Leiomyosarcoma    Cataract     IOL OU    Leiomyosarcoma (HCC)     Rosacea        Past Surgical History:  Past Surgical History:   Procedure Laterality Date    WIDE EXCISION, LESION, LOWER EXTREMITY Right 11/15/2023    Procedure: WIDE RESECTION OF RIGHT THIGH SARCOMA, WOUND VAC;  Surgeon: Abdoulaye Sainz D.O.;  Location: SURGERY [facility];  Service: Orthopedics    TUMOR EXCISION WITH BIOPSY Right 08/04/2023    right thigh    ABDOMINAL SUBTOTAL HYSTERECTOMY      CATARACT EXTRACTION WITH IOL Bilateral     OTHER ORTHOPEDIC SURGERY      Bilateral Feet       Outpatient Encounter Medications as of 6/19/2025   Medication Sig Dispense Refill    nicotine polacrilex (NICORETTE) 2 MG Gum Take 2 mg by mouth see administration instructions. Up to 8 times per day as needed.      SSD 1 % Cream       Apoaequorin (PREVAGEN PO) Take 1 Tablet by mouth every day.      estradiol (VIVELLE DOT) 0.1 MG/24HR PATCH BIWEEKLY Place 1 Patch on the skin two times a week.      metronidazole (METROCREAM) 0.75 % cream Apply 1 Application topically every day.      Multiple Vitamins-Minerals (CENTRUM SILVER 50+WOMEN PO) Take 1 Tablet by mouth every day.      cephALEXin (KEFLEX) 500 MG Cap Take 500 mg by mouth 4 times a day. FOR 7 DAYS (Patient not taking: Reported on 6/19/2025)      bisacodyl EC (DULCOLAX) 5 MG Tablet Delayed Response Take 5 mg by mouth 1 time a day as needed for Constipation (typically 1 x week). (Patient not taking: Reported on  2025)      Wheat Dextrin (BENEFIBER PO) Take 1 Capsule by mouth 2 times a day as needed. (Patient not taking: Reported on 2025)      alendronate (FOSAMAX) 70 MG Tab Take 70 mg by mouth every 7 days.  (Patient not taking: Reported on 2025)      NON SPECIFIED Take 1 Tablet by mouth 1 time a day as needed (nerve pain). NERVIVE: RELIEFS NERVE ACHES, DISCOMFORT, AND WEAKNESS.  Ingredients: Alpha-Lipoic Acid, B Complex vitamins, tumeric, and ginger (Patient not taking: Reported on 2025)       No facility-administered encounter medications on file as of 2025.        Allergies:   No Known Allergies    Family History:   Family History   Problem Relation Age of Onset    Lung Cancer Sister        Social History:   Social History     Tobacco Use   Smoking Status Former    Current packs/day: 0.00    Types: Cigarettes    Quit date: 2023    Years since quittin.3   Smokeless Tobacco Never     Social History     Substance and Sexual Activity   Alcohol Use Not Currently     Social History     Substance and Sexual Activity   Drug Use Never     Social History     Socioeconomic History    Marital status:      Spouse name: Not on file    Number of children: Not on file    Years of education: Not on file    Highest education level: Not on file   Occupational History    Not on file   Tobacco Use    Smoking status: Former     Current packs/day: 0.00     Types: Cigarettes     Quit date: 2023     Years since quittin.3    Smokeless tobacco: Never   Vaping Use    Vaping status: Never Used   Substance and Sexual Activity    Alcohol use: Not Currently    Drug use: Never    Sexual activity: Not on file   Other Topics Concern    Not on file   Social History Narrative    Not on file     Social Drivers of Health     Financial Resource Strain: Not on file   Food Insecurity: Not on file   Transportation Needs: Not on file   Physical Activity: Not on file   Stress: Not on file   Social Connections:  "Not on file   Intimate Partner Violence: Not on file   Housing Stability: Not on file       Review of Systems:  Review of Systems   Constitutional:  Negative for chills and fever.   Respiratory:  Negative for shortness of breath.    Cardiovascular:  Negative for chest pain.   Neurological:  Positive for sensory change. Negative for weakness.        Decreased sensation in saphenous distribution of thigh (unchanged)       Physical Exam:  BP (!) 148/74 (BP Location: Right arm, Patient Position: Sitting, BP Cuff Size: Adult)   Pulse 70   Temp 37.1 °C (98.7 °F) (Temporal)   Ht 1.676 m (5' 6\")   Wt 96.6 kg (213 lb) Comment: pt states  SpO2 96%   BMI 34.38 kg/m²     Physical Exam  Constitutional:       General: She is not in acute distress.     Appearance: Normal appearance. She is not ill-appearing.   HENT:      Head: Normocephalic and atraumatic.   Pulmonary:      Effort: Pulmonary effort is normal. No respiratory distress.   Musculoskeletal:      Cervical back: Normal range of motion and neck supple.      Comments: RLE: Healed surgical wound over proximal medial thigh with stable surrounding radiation changes.   SILT spn, dpn, plantar and sural nerves. Decreased in saphenous distribution. Motor intact to ankle PF/DF. No palpable masses in resection bed or inguinal lymphadenopathy   Skin:     General: Skin is warm and dry.      Capillary Refill: Capillary refill takes less than 2 seconds.   Neurological:      General: No focal deficit present.      Mental Status: She is alert and oriented to person, place, and time.   Psychiatric:         Mood and Affect: Mood normal.         Behavior: Behavior normal.       Imaging:  MRI right femur with and without contrast from June 2025 demonstrates post surgical/post radiation changes and scar.  No evidence of tumor recurrence or nodular enhancement.     CT chest w/o contrast June 2025 I do not appreciate any new or growing pulmonary nodules.  Awaiting final radiology " report.    Assessment and Plan:  Right thigh high grade leiomyosarcoma s/p wide resection of tumor bed on 11/15/23    - I counseled Debo regarding the above clinical and imaging findings.  We will continue with high grade surveillance. This next surveillance will be at ~2 years post op and then we will switch to every 6 months visits until year 5.  Once I have the final radiology report from her CT chest I will call her with the results.  All of her questions were answered and she was in agreement with the plan.    Referrals: None  Restrictions: None  New medications/refills: none  Imaging studies: MRI right femur w/wo, CT chest wo at 3 months (September/October 2025)  Follow-up: After Scans    Abdoulaye Sainz DO  Orthopedics and Orthopedic Oncology

## 2025-06-20 ENCOUNTER — TELEPHONE (OUTPATIENT)
Facility: MEDICAL CENTER | Age: 75
End: 2025-06-20

## 2025-06-20 DIAGNOSIS — C49.9 LEIOMYOSARCOMA (HCC): ICD-10-CM

## 2025-06-20 DIAGNOSIS — C49.21: Primary | ICD-10-CM

## 2025-07-02 ENCOUNTER — APPOINTMENT (OUTPATIENT)
Dept: URBAN - METROPOLITAN AREA CLINIC 6 | Facility: CLINIC | Age: 75
Setting detail: DERMATOLOGY
End: 2025-07-02

## 2025-07-02 DIAGNOSIS — L82.1 OTHER SEBORRHEIC KERATOSIS: ICD-10-CM

## 2025-07-02 DIAGNOSIS — L90.5 SCAR CONDITIONS AND FIBROSIS OF SKIN: ICD-10-CM

## 2025-07-02 DIAGNOSIS — D485 NEOPLASM OF UNCERTAIN BEHAVIOR OF SKIN: ICD-10-CM

## 2025-07-02 DIAGNOSIS — D18.0 HEMANGIOMA: ICD-10-CM

## 2025-07-02 DIAGNOSIS — L81.4 OTHER MELANIN HYPERPIGMENTATION: ICD-10-CM

## 2025-07-02 DIAGNOSIS — Z71.89 OTHER SPECIFIED COUNSELING: ICD-10-CM

## 2025-07-02 DIAGNOSIS — D22 MELANOCYTIC NEVI: ICD-10-CM

## 2025-07-02 DIAGNOSIS — L57.0 ACTINIC KERATOSIS: ICD-10-CM

## 2025-07-02 PROBLEM — D22.61 MELANOCYTIC NEVI OF RIGHT UPPER LIMB, INCLUDING SHOULDER: Status: ACTIVE | Noted: 2025-07-02

## 2025-07-02 PROBLEM — D22.72 MELANOCYTIC NEVI OF LEFT LOWER LIMB, INCLUDING HIP: Status: ACTIVE | Noted: 2025-07-02

## 2025-07-02 PROBLEM — D22.62 MELANOCYTIC NEVI OF LEFT UPPER LIMB, INCLUDING SHOULDER: Status: ACTIVE | Noted: 2025-07-02

## 2025-07-02 PROBLEM — D22.5 MELANOCYTIC NEVI OF TRUNK: Status: ACTIVE | Noted: 2025-07-02

## 2025-07-02 PROBLEM — D18.01 HEMANGIOMA OF SKIN AND SUBCUTANEOUS TISSUE: Status: ACTIVE | Noted: 2025-07-02

## 2025-07-02 PROBLEM — D22.71 MELANOCYTIC NEVI OF RIGHT LOWER LIMB, INCLUDING HIP: Status: ACTIVE | Noted: 2025-07-02

## 2025-07-02 PROBLEM — D48.5 NEOPLASM OF UNCERTAIN BEHAVIOR OF SKIN: Status: ACTIVE | Noted: 2025-07-02

## 2025-07-02 PROCEDURE — ? COUNSELING

## 2025-07-02 PROCEDURE — ? SUNSCREEN TREATMENT REGIMEN

## 2025-07-02 PROCEDURE — ? DEFER

## 2025-07-02 PROCEDURE — ? LIQUID NITROGEN

## 2025-07-02 PROCEDURE — ? ADDITIONAL NOTES

## 2025-07-02 PROCEDURE — ? DIAGNOSIS COMMENT

## 2025-07-02 ASSESSMENT — LOCATION DETAILED DESCRIPTION DERM
LOCATION DETAILED: RIGHT MEDIAL MALAR CHEEK
LOCATION DETAILED: LEFT ANTERIOR PROXIMAL UPPER ARM
LOCATION DETAILED: RIGHT PROXIMAL POSTERIOR UPPER ARM
LOCATION DETAILED: RIGHT ANTERIOR PROXIMAL UPPER ARM
LOCATION DETAILED: RIGHT UPPER CUTANEOUS LIP
LOCATION DETAILED: INFERIOR THORACIC SPINE
LOCATION DETAILED: RIGHT ANTERIOR MEDIAL DISTAL THIGH
LOCATION DETAILED: LOWER STERNUM
LOCATION DETAILED: RIGHT SUPERIOR CENTRAL BUCCAL CHEEK
LOCATION DETAILED: LEFT DISTAL POSTERIOR THIGH
LOCATION DETAILED: EPIGASTRIC SKIN
LOCATION DETAILED: RIGHT VENTRAL LATERAL DISTAL FOREARM
LOCATION DETAILED: RIGHT SUPERIOR MEDIAL MIDBACK
LOCATION DETAILED: LEFT ANTERIOR DISTAL THIGH
LOCATION DETAILED: LEFT INFERIOR MEDIAL FOREHEAD
LOCATION DETAILED: LEFT MEDIAL UPPER BACK
LOCATION DETAILED: LEFT INFERIOR MEDIAL MALAR CHEEK
LOCATION DETAILED: RIGHT INFERIOR MEDIAL FOREHEAD
LOCATION DETAILED: LEFT MID PREAURICULAR CHEEK
LOCATION DETAILED: LEFT PROXIMAL POSTERIOR UPPER ARM
LOCATION DETAILED: LEFT VENTRAL PROXIMAL FOREARM
LOCATION DETAILED: RIGHT DISTAL POSTERIOR THIGH
LOCATION DETAILED: LEFT DISTAL POSTERIOR UPPER ARM
LOCATION DETAILED: RIGHT ANTERIOR PROXIMAL THIGH

## 2025-07-02 ASSESSMENT — LOCATION SIMPLE DESCRIPTION DERM
LOCATION SIMPLE: ABDOMEN
LOCATION SIMPLE: RIGHT POSTERIOR THIGH
LOCATION SIMPLE: UPPER BACK
LOCATION SIMPLE: LEFT THIGH
LOCATION SIMPLE: RIGHT LIP
LOCATION SIMPLE: RIGHT LOWER BACK
LOCATION SIMPLE: LEFT UPPER BACK
LOCATION SIMPLE: RIGHT THIGH
LOCATION SIMPLE: LEFT FOREARM
LOCATION SIMPLE: LEFT CHEEK
LOCATION SIMPLE: LEFT POSTERIOR UPPER ARM
LOCATION SIMPLE: LEFT POSTERIOR THIGH
LOCATION SIMPLE: RIGHT POSTERIOR UPPER ARM
LOCATION SIMPLE: LEFT UPPER ARM
LOCATION SIMPLE: CHEST
LOCATION SIMPLE: RIGHT FOREARM
LOCATION SIMPLE: RIGHT UPPER ARM
LOCATION SIMPLE: RIGHT FOREHEAD
LOCATION SIMPLE: LEFT FOREHEAD
LOCATION SIMPLE: RIGHT CHEEK

## 2025-07-02 ASSESSMENT — LOCATION ZONE DERM
LOCATION ZONE: FACE
LOCATION ZONE: ARM
LOCATION ZONE: LEG
LOCATION ZONE: TRUNK
LOCATION ZONE: LIP

## 2025-07-02 NOTE — PROCEDURE: DIAGNOSIS COMMENT
Comment: lesion is new within the last few months. Amorphous, firm, flesh colored plaque.  Emma has had recent MRI and CT for sarcoma on ipsilateral side  for follow up which were negative.\\nRecommend punch biopsy today, Emma declines today, she wishes to RTC in 1-2 weeks. \\nEmphasized importance of further evaluation of this lesion particularly with her history of soft tissue sarcoma, she understands and agrees to RTC
Render Risk Assessment In Note?: no
Detail Level: Detailed

## 2025-07-02 NOTE — PROCEDURE: LIQUID NITROGEN
Duration Of Freeze Thaw-Cycle (Seconds): 3
Render Post-Care Instructions In Note?: no
Detail Level: Detailed
Consent: The patient's consent was obtained including but not limited to risks of crusting, scabbing, blistering, scarring, darker or lighter pigmentary change, recurrence, incomplete removal and infection.
Show Applicator Variable?: Yes
Post-Care Instructions: I reviewed with the patient in detail post-care instructions. Patient is to wear sunprotection, and avoid picking at any of the treated lesions. Pt may apply Vaseline to crusted or scabbing areas.

## 2025-07-02 NOTE — PROCEDURE: DEFER
X Size Of Lesion In Cm (Optional): 0
Introduction Text (Please End With A Colon): The following procedure was deferred: biopsy by shave method
Reason To Defer Override: Will return in 2-3 weeks for punch biopsy
Detail Level: Detailed

## 2025-07-16 ENCOUNTER — APPOINTMENT (OUTPATIENT)
Dept: URBAN - METROPOLITAN AREA CLINIC 6 | Facility: CLINIC | Age: 75
Setting detail: DERMATOLOGY
End: 2025-07-16

## 2025-07-16 DIAGNOSIS — D485 NEOPLASM OF UNCERTAIN BEHAVIOR OF SKIN: ICD-10-CM

## 2025-07-16 PROBLEM — D48.5 NEOPLASM OF UNCERTAIN BEHAVIOR OF SKIN: Status: ACTIVE | Noted: 2025-07-16

## 2025-07-16 PROCEDURE — ? DIAGNOSIS COMMENT

## 2025-07-16 PROCEDURE — ? BIOPSY BY PUNCH METHOD

## 2025-07-16 ASSESSMENT — LOCATION DETAILED DESCRIPTION DERM: LOCATION DETAILED: RIGHT ANTERIOR DISTAL THIGH

## 2025-07-16 ASSESSMENT — LOCATION ZONE DERM: LOCATION ZONE: LEG

## 2025-07-16 ASSESSMENT — LOCATION SIMPLE DESCRIPTION DERM: LOCATION SIMPLE: RIGHT THIGH

## 2025-07-16 NOTE — PROCEDURE: DIAGNOSIS COMMENT
Comment: 7/16/25: Emma has noticed another similar, smaller lesion adjacent to the original lesion. No associated systemic symptoms or other new lesions/rashes. \\n\\nPrevious: Lesion is new within the last few months. Amorphous, firm, flesh colored plaque.  Emma has had recent MRI and CT for sarcoma on ipsilateral side for follow up which were negative.
Render Risk Assessment In Note?: no
Detail Level: Detailed

## 2025-07-16 NOTE — PROCEDURE: BIOPSY BY PUNCH METHOD
Detail Level: Detailed
Was A Bandage Applied: Yes
Punch Size In Mm: 4
Size Of Lesion In Cm (Optional): 0
Depth Of Punch Biopsy: dermis
Biopsy Type: H and E
Anesthesia Type: 1% lidocaine without epinephrine and a 1:10 solution of 8.4% sodium bicarbonate
Anesthesia Volume In Cc: 0.5
Hemostasis: None
Epidermal Sutures: 4-0 Prolene
Wound Care: Petrolatum
Dressing: bandage
Suture Removal: 10 days
Patient Will Remove Sutures At Home?: No
Lab: [id number]
Lab Facility: 
Consent: Written consent was obtained and risks were reviewed including but not limited to scarring, infection, bleeding, scabbing, incomplete removal, nerve damage and allergy to anesthesia.
Post-Care Instructions: I reviewed with the patient in detail post-care instructions. Patient is to keep the biopsy site dry overnight, and then apply bacitracin twice daily until healed. Patient may apply hydrogen peroxide soaks to remove any crusting.
Home Suture Removal Text: Patient was provided a home suture removal kit and will remove their sutures at home.  If they have any questions or difficulties they will call the office.
Notification Instructions: Patient will be notified of biopsy results. However, patient instructed to call the office if not contacted within 2 weeks.
Billing Type: Third-Party Bill
Information: Selecting Yes will display possible errors in your note based on the variables you have selected. This validation is only offered as a suggestion for you. PLEASE NOTE THAT THE VALIDATION TEXT WILL BE REMOVED WHEN YOU FINALIZE YOUR NOTE. IF YOU WANT TO FAX A PRELIMINARY NOTE YOU WILL NEED TO TOGGLE THIS TO 'NO' IF YOU DO NOT WANT IT IN YOUR FAXED NOTE.

## 2025-07-24 ENCOUNTER — APPOINTMENT (OUTPATIENT)
Dept: URBAN - METROPOLITAN AREA CLINIC 6 | Facility: CLINIC | Age: 75
Setting detail: DERMATOLOGY
End: 2025-07-24

## 2025-07-24 DIAGNOSIS — Z48.02 ENCOUNTER FOR REMOVAL OF SUTURES: ICD-10-CM

## 2025-07-24 PROCEDURE — ? SUTURE REMOVAL (GLOBAL PERIOD)

## 2025-07-24 ASSESSMENT — LOCATION ZONE DERM: LOCATION ZONE: LEG

## 2025-07-24 ASSESSMENT — LOCATION SIMPLE DESCRIPTION DERM: LOCATION SIMPLE: RIGHT THIGH

## 2025-07-24 ASSESSMENT — LOCATION DETAILED DESCRIPTION DERM: LOCATION DETAILED: RIGHT ANTERIOR DISTAL THIGH

## 2025-07-24 NOTE — PROCEDURE: SUTURE REMOVAL (GLOBAL PERIOD)
Detail Level: Detailed
Add 13074 Cpt? (Important Note: In 2017 The Use Of 16148 Is Being Tracked By Cms To Determine Future Global Period Reimbursement For Global Periods): no
Suture Removal Completed By (Optional): Dayna Coyle

## (undated) DEVICE — CANISTER SUCTION 3000ML MECHANICAL FILTER AUTO SHUTOFF MEDI-VAC NONSTERILE LF DISP  (40EA/CA)

## (undated) DEVICE — RESERVOIR SUCTION 100 CC - SILICONE (20EA/CA)

## (undated) DEVICE — CHLORAPREP 26 ML APPLICATOR - ORANGE TINT(25/CA)

## (undated) DEVICE — KIT  I.V. START (100EA/CA)

## (undated) DEVICE — DRAPE LAPAROTOMY T SHEET - (12EA/CA)

## (undated) DEVICE — WATER IRRIGATION STERILE 1000ML (12EA/CA)

## (undated) DEVICE — TOWEL STOP TIMEOUT SAFETY FLAG (40EA/CA)

## (undated) DEVICE — SET LEADWIRE 5 LEAD BEDSIDE DISPOSABLE ECG (1SET OF 5/EA)

## (undated) DEVICE — ELECTRODE DUAL RETURN W/ CORD - (50/PK)

## (undated) DEVICE — SENSOR OXIMETER ADULT SPO2 RD SET (20EA/BX)

## (undated) DEVICE — SET EXTENSION WITH 2 PORTS (48EA/CA) ***PART #2C8610 IS A SUBSTITUTE*****

## (undated) DEVICE — COVER LIGHT HANDLE ALC PLUS DISP (18EA/BX)

## (undated) DEVICE — SUCTION INSTRUMENT YANKAUER BULBOUS TIP W/O VENT (50EA/CA)

## (undated) DEVICE — GOWN WARMING STANDARD FLEX - (30/CA)

## (undated) DEVICE — CANNULA O2 COMFORT SOFT EAR ADULT 7 FT TUBING (50/CA)

## (undated) DEVICE — TRAY SRGPRP PVP IOD WT PRP - (20/CA)

## (undated) DEVICE — DRAPE SURGICAL U 77X120 - (10/CA)

## (undated) DEVICE — SODIUM CHL IRRIGATION 0.9% 1000ML (12EA/CA)

## (undated) DEVICE — TUBE CONNECTING SUCTION - CLEAR PLASTIC STERILE 72 IN (50EA/CA)

## (undated) DEVICE — TUBING CLEARLINK DUO-VENT - C-FLO (48EA/CA)

## (undated) DEVICE — MASK OXYGEN VNYL ADLT MED CONC WITH 7 FOOT TUBING  - (50EA/CA)

## (undated) DEVICE — SLEEVE VASO CALF MED - (10PR/CA)

## (undated) DEVICE — DRAIN JACKSON PRATT 15FR - (10EA/CA)

## (undated) DEVICE — PACK MINOR BASIN - (2EA/CA)

## (undated) DEVICE — STAPLER SKIN DISP - (6/BX 10BX/CA) VISISTAT

## (undated) DEVICE — SLEEVE, VASO, THIGH, MED

## (undated) DEVICE — LACTATED RINGERS INJ 1000 ML - (14EA/CA 60CA/PF)

## (undated) DEVICE — CANISTER SUCTION RIGID RED 1500CC (40EA/CA)

## (undated) DEVICE — SUTURE GENERAL